# Patient Record
Sex: FEMALE | Race: WHITE | Employment: FULL TIME | URBAN - METROPOLITAN AREA
[De-identification: names, ages, dates, MRNs, and addresses within clinical notes are randomized per-mention and may not be internally consistent; named-entity substitution may affect disease eponyms.]

---

## 2017-08-14 ENCOUNTER — APPOINTMENT (OUTPATIENT)
Dept: GENERAL RADIOLOGY | Facility: HOSPITAL | Age: 32
End: 2017-08-14
Attending: PHYSICIAN ASSISTANT
Payer: COMMERCIAL

## 2017-08-14 ENCOUNTER — HOSPITAL ENCOUNTER (EMERGENCY)
Facility: HOSPITAL | Age: 32
Discharge: HOME | End: 2017-08-14
Attending: PHYSICIAN ASSISTANT
Payer: COMMERCIAL

## 2017-08-14 VITALS
SYSTOLIC BLOOD PRESSURE: 112 MMHG | RESPIRATION RATE: 20 BRPM | BODY MASS INDEX: 24.55 KG/M2 | DIASTOLIC BLOOD PRESSURE: 74 MMHG | TEMPERATURE: 99 F | WEIGHT: 130 LBS | HEART RATE: 72 BPM | HEIGHT: 61 IN | OXYGEN SATURATION: 99 %

## 2017-08-14 DIAGNOSIS — S80.811A ABRASION OF MULTIPLE SITES OF RIGHT LOWER EXTREMITY, INITIAL ENCOUNTER: ICD-10-CM

## 2017-08-14 DIAGNOSIS — S92.514A CLOSED NONDISPLACED FRACTURE OF PROXIMAL PHALANX OF LESSER TOE OF RIGHT FOOT, INITIAL ENCOUNTER: Primary | ICD-10-CM

## 2017-08-14 PROCEDURE — 99284 EMERGENCY DEPT VISIT MOD MDM: CPT

## 2017-08-14 PROCEDURE — 73610 X-RAY EXAM OF ANKLE: CPT | Performed by: PHYSICIAN ASSISTANT

## 2017-08-14 PROCEDURE — 73630 X-RAY EXAM OF FOOT: CPT | Performed by: PHYSICIAN ASSISTANT

## 2017-08-14 PROCEDURE — 73560 X-RAY EXAM OF KNEE 1 OR 2: CPT | Performed by: PHYSICIAN ASSISTANT

## 2017-08-14 PROCEDURE — 28510 TREATMENT OF TOE FRACTURE: CPT

## 2017-08-14 RX ORDER — HYDROCODONE BITARTRATE AND ACETAMINOPHEN 5; 325 MG/1; MG/1
1 TABLET ORAL ONCE
Status: COMPLETED | OUTPATIENT
Start: 2017-08-14 | End: 2017-08-14

## 2017-08-14 RX ORDER — HYDROCODONE BITARTRATE AND ACETAMINOPHEN 5; 325 MG/1; MG/1
1 TABLET ORAL EVERY 6 HOURS PRN
Qty: 6 TABLET | Refills: 0 | Status: SHIPPED | OUTPATIENT
Start: 2017-08-14 | End: 2017-08-21

## 2017-08-14 RX ORDER — IBUPROFEN 600 MG/1
TABLET ORAL
Qty: 20 TABLET | Refills: 0 | Status: SHIPPED | OUTPATIENT
Start: 2017-08-14 | End: 2019-07-24

## 2017-08-15 NOTE — ED PROVIDER NOTES
Patient Seen in: [facility] AND [facility] Emergency Department    History   Patient presents with:   Foot Pain    Stated Complaint: foot pains    HPI    HPI: Lizzie Up is a 28year old female who presents with chief complaint of right knee, ankle and foot Resp 20   Ht 154.9 cm (5' 1\")   Wt 59 kg   LMP 08/13/2017   SpO2 100%   BMI 24.56 kg/m²         Physical Exam      Constitutional: The patient is cooperative. Appears well-developed and well-nourished. Mild discomfort.   Psychological: Alert, No abnormali speech. Skin: Skin is normal to inspection and palpation, except as document. Warm and dry. No obvious rash.     Differential diagnosis to include fracture vs. Strain/sprain vs. contusion       ED Course   Labs Reviewed - No data to display  Patient fitt

## 2019-04-08 ENCOUNTER — APPOINTMENT (OUTPATIENT)
Dept: LAB | Facility: HOSPITAL | Age: 34
End: 2019-04-08
Attending: ADVANCED PRACTICE MIDWIFE
Payer: COMMERCIAL

## 2019-04-08 ENCOUNTER — OFFICE VISIT (OUTPATIENT)
Dept: OBGYN CLINIC | Facility: CLINIC | Age: 34
End: 2019-04-08
Payer: COMMERCIAL

## 2019-04-08 ENCOUNTER — TELEPHONE (OUTPATIENT)
Dept: OBGYN CLINIC | Facility: CLINIC | Age: 34
End: 2019-04-08

## 2019-04-08 VITALS
DIASTOLIC BLOOD PRESSURE: 74 MMHG | SYSTOLIC BLOOD PRESSURE: 122 MMHG | WEIGHT: 135 LBS | BODY MASS INDEX: 25.49 KG/M2 | HEIGHT: 61 IN

## 2019-04-08 DIAGNOSIS — N92.6 IRREGULAR MENSES: ICD-10-CM

## 2019-04-08 DIAGNOSIS — Z30.011 ENCOUNTER FOR INITIAL PRESCRIPTION OF CONTRACEPTIVE PILLS: ICD-10-CM

## 2019-04-08 DIAGNOSIS — Z30.011 ENCOUNTER FOR INITIAL PRESCRIPTION OF CONTRACEPTIVE PILLS: Primary | ICD-10-CM

## 2019-04-08 PROBLEM — E07.9 THYROID DYSFUNCTION: Status: ACTIVE | Noted: 2019-04-08

## 2019-04-08 PROCEDURE — 36415 COLL VENOUS BLD VENIPUNCTURE: CPT

## 2019-04-08 PROCEDURE — 99202 OFFICE O/P NEW SF 15 MIN: CPT | Performed by: ADVANCED PRACTICE MIDWIFE

## 2019-04-08 PROCEDURE — 84702 CHORIONIC GONADOTROPIN TEST: CPT

## 2019-04-08 RX ORDER — NORETHINDRONE ACETATE AND ETHINYL ESTRADIOL .03; 1.5 MG/1; MG/1
1 TABLET ORAL DAILY
Qty: 1 PACKAGE | Refills: 11 | Status: SHIPPED | OUTPATIENT
Start: 2019-04-08 | End: 2019-05-06

## 2019-04-08 NOTE — TELEPHONE ENCOUNTER
Spoke with pt and advised unofficially bhcg is negative and results still need to be reviewed by MES. Pt agreed and voiced understanding.

## 2019-04-08 NOTE — PROGRESS NOTES
HPI:    Patient ID: Dwayne Lal is a 35year old female who presents for contraction. Reports menses irregular q 28-40 days x 5-7 days regular flow. Pt history of thyroid dysfunction managed by Dr Ghada Caraballo. Sexually active with one partner.   Incons

## 2019-04-09 ENCOUNTER — TELEPHONE (OUTPATIENT)
Dept: OBGYN CLINIC | Facility: CLINIC | Age: 34
End: 2019-04-09

## 2019-04-12 ENCOUNTER — TELEPHONE (OUTPATIENT)
Dept: OBGYN CLINIC | Facility: CLINIC | Age: 34
End: 2019-04-12

## 2019-04-12 RX ORDER — ONDANSETRON 4 MG/1
4 TABLET, FILM COATED ORAL EVERY 8 HOURS PRN
Qty: 30 TABLET | Refills: 0 | Status: SHIPPED | OUTPATIENT
Start: 2019-04-12 | End: 2019-07-24

## 2019-04-12 NOTE — TELEPHONE ENCOUNTER
Pt. States that the New Rx for birth control is making the pt feel nauseous,so she wants to know if she can try a different med?

## 2019-04-13 NOTE — TELEPHONE ENCOUNTER
She just started the pill so her body might need a little time to get adjusted. It is okay to give her an rx for Zopfran to help with the nausea. If it continues after the first month we can try a different form of birth control.

## 2019-04-13 NOTE — TELEPHONE ENCOUNTER
Notified pt of MES instructions & rx. Advised pt to give it 3 mos & if she continues to have unwanted side effects we can reevaluate & change medication. Suggested to pt to try to take ocp at noc before bed.  Pt verbalized an understanding & agrees w/ plan

## 2019-06-17 ENCOUNTER — OFFICE VISIT (OUTPATIENT)
Dept: OBGYN CLINIC | Facility: CLINIC | Age: 34
End: 2019-06-17
Payer: COMMERCIAL

## 2019-06-17 VITALS
WEIGHT: 132.25 LBS | SYSTOLIC BLOOD PRESSURE: 116 MMHG | DIASTOLIC BLOOD PRESSURE: 81 MMHG | HEART RATE: 84 BPM | HEIGHT: 61 IN | BODY MASS INDEX: 24.97 KG/M2

## 2019-06-17 DIAGNOSIS — N89.8 VAGINAL DRYNESS: Primary | ICD-10-CM

## 2019-06-17 PROCEDURE — 99213 OFFICE O/P EST LOW 20 MIN: CPT | Performed by: ADVANCED PRACTICE MIDWIFE

## 2019-06-17 NOTE — PROGRESS NOTES
HPI:    Patient ID: Salma Caba is a 29year old female. Episodic visit to discuss vaginal dryness that is interfering with her personal relationship. Patient is concerned about her hormone levels, feels that her estrogen might be low.  She has an en well-nourished. HENT:   Head: Normocephalic and atraumatic. Eyes: No scleral icterus. Neck: Normal range of motion. Pulmonary/Chest: Effort normal. No respiratory distress. Genitourinary: No labial fusion.  There is no rash, tenderness, lesion or

## 2019-06-20 ENCOUNTER — TELEPHONE (OUTPATIENT)
Dept: OBGYN CLINIC | Facility: CLINIC | Age: 34
End: 2019-06-20

## 2019-06-20 NOTE — PROGRESS NOTES
Patient seen in conjunction with SAPN. I was present for history, physical, assessment and plan. I endorse the documentation per SAPN.

## 2019-06-28 PROCEDURE — 99284 EMERGENCY DEPT VISIT MOD MDM: CPT

## 2019-06-28 PROCEDURE — 93005 ELECTROCARDIOGRAM TRACING: CPT

## 2019-06-28 PROCEDURE — 36415 COLL VENOUS BLD VENIPUNCTURE: CPT

## 2019-06-28 PROCEDURE — 93010 ELECTROCARDIOGRAM REPORT: CPT | Performed by: EMERGENCY MEDICINE

## 2019-06-29 ENCOUNTER — HOSPITAL ENCOUNTER (EMERGENCY)
Facility: HOSPITAL | Age: 34
Discharge: HOME | End: 2019-06-29
Attending: EMERGENCY MEDICINE
Payer: COMMERCIAL

## 2019-06-29 VITALS
BODY MASS INDEX: 23.92 KG/M2 | RESPIRATION RATE: 20 BRPM | DIASTOLIC BLOOD PRESSURE: 82 MMHG | SYSTOLIC BLOOD PRESSURE: 105 MMHG | TEMPERATURE: 98 F | WEIGHT: 130 LBS | HEART RATE: 91 BPM | OXYGEN SATURATION: 95 % | HEIGHT: 62 IN

## 2019-06-29 DIAGNOSIS — R07.9 CHEST PAIN OF UNCERTAIN ETIOLOGY: Primary | ICD-10-CM

## 2019-06-29 LAB
ANION GAP SERPL CALC-SCNC: 4 MMOL/L (ref 0–18)
BASOPHILS # BLD AUTO: 0.03 X10(3) UL (ref 0–0.2)
BASOPHILS NFR BLD AUTO: 0.5 %
BUN BLD-MCNC: 13 MG/DL (ref 7–18)
BUN/CREAT SERPL: 12.9 (ref 10–20)
CALCIUM BLD-MCNC: 8.5 MG/DL (ref 8.5–10.1)
CHLORIDE SERPL-SCNC: 105 MMOL/L (ref 98–112)
CO2 SERPL-SCNC: 29 MMOL/L (ref 21–32)
CREAT BLD-MCNC: 1.01 MG/DL (ref 0.55–1.02)
DEPRECATED RDW RBC AUTO: 40.5 FL (ref 35.1–46.3)
EOSINOPHIL # BLD AUTO: 0.13 X10(3) UL (ref 0–0.7)
EOSINOPHIL NFR BLD AUTO: 2.2 %
ERYTHROCYTE [DISTWIDTH] IN BLOOD BY AUTOMATED COUNT: 11.2 % (ref 11–15)
GLUCOSE BLD-MCNC: 112 MG/DL (ref 70–99)
HCT VFR BLD AUTO: 38.6 % (ref 35–48)
HGB BLD-MCNC: 13.1 G/DL (ref 12–16)
IMM GRANULOCYTES # BLD AUTO: 0.01 X10(3) UL (ref 0–1)
IMM GRANULOCYTES NFR BLD: 0.2 %
LYMPHOCYTES # BLD AUTO: 1.6 X10(3) UL (ref 1–4)
LYMPHOCYTES NFR BLD AUTO: 27.6 %
MCH RBC QN AUTO: 33.4 PG (ref 26–34)
MCHC RBC AUTO-ENTMCNC: 33.9 G/DL (ref 31–37)
MCV RBC AUTO: 98.5 FL (ref 80–100)
MONOCYTES # BLD AUTO: 0.65 X10(3) UL (ref 0.1–1)
MONOCYTES NFR BLD AUTO: 11.2 %
NEUTROPHILS # BLD AUTO: 3.37 X10 (3) UL (ref 1.5–7.7)
NEUTROPHILS # BLD AUTO: 3.37 X10(3) UL (ref 1.5–7.7)
NEUTROPHILS NFR BLD AUTO: 58.3 %
OSMOLALITY SERPL CALC.SUM OF ELEC: 287 MOSM/KG (ref 275–295)
PLATELET # BLD AUTO: 339 10(3)UL (ref 150–450)
POTASSIUM SERPL-SCNC: 3.7 MMOL/L (ref 3.5–5.1)
RBC # BLD AUTO: 3.92 X10(6)UL (ref 3.8–5.3)
SODIUM SERPL-SCNC: 138 MMOL/L (ref 136–145)
TROPONIN I SERPL-MCNC: <0.045 NG/ML (ref ?–0.04)
WBC # BLD AUTO: 5.8 X10(3) UL (ref 4–11)

## 2019-06-29 PROCEDURE — 84484 ASSAY OF TROPONIN QUANT: CPT | Performed by: EMERGENCY MEDICINE

## 2019-06-29 PROCEDURE — 80048 BASIC METABOLIC PNL TOTAL CA: CPT | Performed by: EMERGENCY MEDICINE

## 2019-06-29 PROCEDURE — 85025 COMPLETE CBC W/AUTO DIFF WBC: CPT | Performed by: EMERGENCY MEDICINE

## 2019-06-29 RX ORDER — LIDOCAINE HYDROCHLORIDE 20 MG/ML
10 SOLUTION OROPHARYNGEAL ONCE
Status: COMPLETED | OUTPATIENT
Start: 2019-06-29 | End: 2019-06-29

## 2019-06-29 RX ORDER — MAGNESIUM HYDROXIDE/ALUMINUM HYDROXICE/SIMETHICONE 120; 1200; 1200 MG/30ML; MG/30ML; MG/30ML
30 SUSPENSION ORAL ONCE
Status: COMPLETED | OUTPATIENT
Start: 2019-06-29 | End: 2019-06-29

## 2019-06-29 NOTE — ED NOTES
Patient provided discharge instructions. Patient provided with instructions of how and when to follow up with healthcare providers. Patient provided instructions of when to seek emergency care. Patient verbalizes understanding. All questions answered.  Driss

## 2019-06-29 NOTE — ED PROVIDER NOTES
Patient Seen in: [facility] AND [facility] Emergency Department    History   Patient presents with:  Chest Pain      HPI    Patient presents to the ED complaining of epigastric and midsternal burning discomfort starting at 7 PM.  Symptoms radiate up into her loki stridor. No respiratory distress. Abdominal: Soft. She exhibits no distension. There is tenderness. There is no rebound and no guarding. Mild epigastric tenderness   Musculoskeletal: She exhibits no edema or deformity.    Neurological: She is alert and interpreted all ED vitals.     Pulse Ox: 95%, Room air, Normal     Monitor Interpretation:   normal sinus rhythm    Differential Diagnosis/ Diagnostic Considerations: GERD, gastritis, atypical chest pain, ACS    Medical Record Review: I personally reviewed

## 2019-07-01 ENCOUNTER — TELEPHONE (OUTPATIENT)
Dept: OBGYN CLINIC | Facility: CLINIC | Age: 34
End: 2019-07-01

## 2019-07-01 NOTE — TELEPHONE ENCOUNTER
Per pt has been under the care of NOEMY in past and would like to start care with him again. Is interested in planning for pregnancy. Pt stated she is unable to schedule at this time, but will call back to schedule. Dept number given. No further question.

## 2019-07-12 ENCOUNTER — MED REC SCAN ONLY (OUTPATIENT)
Dept: OBGYN CLINIC | Facility: CLINIC | Age: 34
End: 2019-07-12

## 2019-07-24 ENCOUNTER — LAB ENCOUNTER (OUTPATIENT)
Dept: LAB | Facility: HOSPITAL | Age: 34
End: 2019-07-24
Attending: ADVANCED PRACTICE MIDWIFE
Payer: COMMERCIAL

## 2019-07-24 ENCOUNTER — OFFICE VISIT (OUTPATIENT)
Dept: OBGYN CLINIC | Facility: CLINIC | Age: 34
End: 2019-07-24
Payer: COMMERCIAL

## 2019-07-24 VITALS
BODY MASS INDEX: 23 KG/M2 | DIASTOLIC BLOOD PRESSURE: 67 MMHG | WEIGHT: 128.19 LBS | HEART RATE: 94 BPM | SYSTOLIC BLOOD PRESSURE: 100 MMHG

## 2019-07-24 DIAGNOSIS — Z20.828 EXPOSURE TO HERPES: Primary | ICD-10-CM

## 2019-07-24 LAB
HBV SURFACE AG SER-ACNC: <0.1 [IU]/L
HBV SURFACE AG SERPL QL IA: NONREACTIVE
HCV AB SERPL QL IA: NONREACTIVE

## 2019-07-24 PROCEDURE — 36415 COLL VENOUS BLD VENIPUNCTURE: CPT

## 2019-07-24 PROCEDURE — 86694 HERPES SIMPLEX NES ANTBDY: CPT

## 2019-07-24 PROCEDURE — 86780 TREPONEMA PALLIDUM: CPT | Performed by: ADVANCED PRACTICE MIDWIFE

## 2019-07-24 PROCEDURE — 86803 HEPATITIS C AB TEST: CPT

## 2019-07-24 PROCEDURE — 87340 HEPATITIS B SURFACE AG IA: CPT

## 2019-07-24 PROCEDURE — 99213 OFFICE O/P EST LOW 20 MIN: CPT | Performed by: ADVANCED PRACTICE MIDWIFE

## 2019-07-24 PROCEDURE — 87389 HIV-1 AG W/HIV-1&-2 AB AG IA: CPT

## 2019-07-26 LAB
HSV TYPE 1/2 COMBINED AB, IGG: >22.4 IV
T PALLIDUM AB SER QL: NEGATIVE

## 2019-07-27 ENCOUNTER — TELEPHONE (OUTPATIENT)
Dept: OBGYN CLINIC | Facility: CLINIC | Age: 34
End: 2019-07-27

## 2019-07-27 DIAGNOSIS — B00.9 HSV INFECTION: Primary | ICD-10-CM

## 2019-07-27 NOTE — TELEPHONE ENCOUNTER
----- Message from Ritu Qiu CNM sent at 7/27/2019 12:53 PM CDT -----  I need titers for each HSV type.  Please call lab

## 2019-07-27 NOTE — TELEPHONE ENCOUNTER
Per Freddy Beasley in lab we need to call on Monday since specimen was sent to their reference lab which is closed on the weekend.

## 2019-07-29 NOTE — TELEPHONE ENCOUNTER
Spoke with Patricia Kramer in the lab and requested titers for each HSV type. Per Patricia Kramer they are not able to do the send out for this as they only keep the blood for 5 days. Pt would need to be redrawn and we are to order [id number].

## 2019-07-31 ENCOUNTER — LAB ENCOUNTER (OUTPATIENT)
Dept: LAB | Facility: HOSPITAL | Age: 34
End: 2019-07-31
Attending: ADVANCED PRACTICE MIDWIFE
Payer: COMMERCIAL

## 2019-07-31 DIAGNOSIS — B00.9 HSV INFECTION: ICD-10-CM

## 2019-07-31 DIAGNOSIS — Z20.828 EXPOSURE TO HERPES: Primary | ICD-10-CM

## 2019-07-31 PROCEDURE — 86695 HERPES SIMPLEX TYPE 1 TEST: CPT

## 2019-07-31 PROCEDURE — 86696 HERPES SIMPLEX TYPE 2 TEST: CPT

## 2019-07-31 PROCEDURE — 86694 HERPES SIMPLEX NES ANTBDY: CPT

## 2019-07-31 PROCEDURE — 36415 COLL VENOUS BLD VENIPUNCTURE: CPT

## 2019-07-31 NOTE — TELEPHONE ENCOUNTER
Home # was answered by a man who stated no one by that name lived there. Work # rang then went busy. Regular and Certified letters mailed to pt.

## 2019-07-31 NOTE — TELEPHONE ENCOUNTER
Pt states she saw the [facility] msg.and returned the call. Pt was advised she will need to return to the lab for an additional blood draw for the titers for each HSV type. The order is in the computer. Pt states she will go to the lab tonight.   Pt was adv

## 2019-08-01 ENCOUNTER — TELEPHONE (OUTPATIENT)
Dept: OBGYN CLINIC | Facility: CLINIC | Age: 34
End: 2019-08-01

## 2019-08-01 NOTE — TELEPHONE ENCOUNTER
Pt wants to know if theres any medication she can take orally instead of the monistat.  Please advise

## 2019-08-01 NOTE — TELEPHONE ENCOUNTER
Pt was told by cnm at last visit to use replense for vag dryness. Pt now feels she has a yeast infection & wants an rx to traet. Advised pt she needs appt to get rx but she can use 3 or 5 day otc monistat.  Pt verbalized an understanding & agrees w/ plan

## 2019-08-03 LAB
HSV 1 GLYCOPROTEIN G AB, IGG: 0.4 IV
HSV 2 GLYCOPROTEIN G AB, IGG: 23.4 IV
HSV TYPE 1/2 COMBINED AB, IGG: >22.4 IV
HSV TYPE 1/2 COMBINED ABS, IGM: 0.41 IV

## 2019-08-05 ENCOUNTER — TELEPHONE (OUTPATIENT)
Dept: OBGYN CLINIC | Facility: CLINIC | Age: 34
End: 2019-08-05

## 2019-08-05 RX ORDER — VALACYCLOVIR HYDROCHLORIDE 1 G/1
1 TABLET, FILM COATED ORAL EVERY 12 HOURS SCHEDULED
Qty: 14 TABLET | Refills: 0 | Status: SHIPPED | OUTPATIENT
Start: 2019-08-05 | End: 2019-08-12

## 2019-08-05 NOTE — TELEPHONE ENCOUNTER
Christa Castellon CNM  P Em Ob/Gyne Midwifery Clinical Pool             Discussed with Patient. Please print and mail results - unable to release on [facility]. Results mailed to pt per MBW.

## 2019-08-06 ENCOUNTER — PATIENT MESSAGE (OUTPATIENT)
Dept: OBGYN CLINIC | Facility: CLINIC | Age: 34
End: 2019-08-06

## 2019-08-07 PROBLEM — Z20.828 EXPOSURE TO HERPES SIMPLEX VIRUS (HSV): Status: ACTIVE | Noted: 2019-08-07

## 2019-08-08 NOTE — PROGRESS NOTES
HPI:   Mayo Filter is a 29year old female who presents for a gyne consult. Fiance just diagnosed with herpes outbreak. Patient has never had any suspicious lesions.    Wt Readings from Last 3 Encounters:  07/24/19 : 128 lb 3.2 oz (58.2 kg)  06/29/19 : intact    ASSESSMENT AND PLAN:   Herminia Brand is a 29year old female who presents for a gyne exam.     1. Exposure to herpes  Counseling 100% of visit. 15 minute visit.   - HEPATITIS B SURFACE ANTIGEN; Future  - HCV ANTIBODY;  Future  - T PALLIDUM SCREE

## 2019-10-03 ENCOUNTER — OFFICE VISIT (OUTPATIENT)
Dept: OBGYN CLINIC | Facility: CLINIC | Age: 34
End: 2019-10-03
Payer: COMMERCIAL

## 2019-10-03 VITALS
BODY MASS INDEX: 23 KG/M2 | WEIGHT: 128 LBS | HEART RATE: 84 BPM | DIASTOLIC BLOOD PRESSURE: 74 MMHG | SYSTOLIC BLOOD PRESSURE: 106 MMHG

## 2019-10-03 DIAGNOSIS — Z01.419 WOMEN'S ANNUAL ROUTINE GYNECOLOGICAL EXAMINATION: Primary | ICD-10-CM

## 2019-10-03 DIAGNOSIS — Z31.69 ENCOUNTER FOR PRECONCEPTION CONSULTATION: ICD-10-CM

## 2019-10-03 DIAGNOSIS — R89.4 HERPES SIMPLEX ANTIBODY POSITIVE: ICD-10-CM

## 2019-10-03 PROCEDURE — 99213 OFFICE O/P EST LOW 20 MIN: CPT | Performed by: OBSTETRICS & GYNECOLOGY

## 2019-10-03 PROCEDURE — 99395 PREV VISIT EST AGE 18-39: CPT | Performed by: OBSTETRICS & GYNECOLOGY

## 2019-10-04 NOTE — PROGRESS NOTES
Annual Gyn Exam    HPI  Teresa London is a known patient of mine from my previous [address] [facility] practice who I have not seen in 5+ years. She is here for an annual gynecologic evaluation. In addition, she has concerns about a recent diagnosis of HSV 2.   She and her infection. Current Outpatient Medications:   •  LEVOTHYROXINE SODIUM OR, 100 mcg by Does not apply route.  , Disp: , Rfl:     Review of Systems   Constitutional: Negative for chills, fatigue and fever. HENT: Negative for hearing loss.     Eyes: Negativ is normal.     /74   Pulse 84   Wt 128 lb (58.1 kg)   LMP 09/28/2019   BMI 23.41 kg/m²     Assessment and Plan  1.  Women's annual routine gynecological examination  A: 29 y.o. Chemo Caba here for annual gynecologic evaluation  Exam, pap with HPV and fernando

## 2019-12-10 ENCOUNTER — OFFICE VISIT (OUTPATIENT)
Dept: OBGYN CLINIC | Facility: CLINIC | Age: 34
End: 2019-12-10
Payer: COMMERCIAL

## 2019-12-10 VITALS
BODY MASS INDEX: 24 KG/M2 | HEART RATE: 97 BPM | DIASTOLIC BLOOD PRESSURE: 69 MMHG | WEIGHT: 130 LBS | SYSTOLIC BLOOD PRESSURE: 103 MMHG

## 2019-12-10 DIAGNOSIS — Z32.01 PREGNANCY EXAMINATION OR TEST, POSITIVE RESULT: Primary | ICD-10-CM

## 2019-12-10 DIAGNOSIS — N92.6 MISSED MENSES: ICD-10-CM

## 2019-12-10 PROCEDURE — 81025 URINE PREGNANCY TEST: CPT | Performed by: OBSTETRICS & GYNECOLOGY

## 2019-12-10 PROCEDURE — 99213 OFFICE O/P EST LOW 20 MIN: CPT | Performed by: OBSTETRICS & GYNECOLOGY

## 2019-12-10 RX ORDER — LEVOTHYROXINE SODIUM 0.1 MG/1
125 TABLET ORAL
COMMUNITY
Start: 2019-12-09 | End: 2022-01-18

## 2019-12-11 NOTE — PROGRESS NOTES
[facility], [facility]  Obstetrics and Gynecology  Pregnancy Confirmation  Carmine Good MD    HPI     Dwayne Lal is a 29year old [id number] with Patient's last menstrual period was 09/28/2019 (exact date). who presents for pregnancy confirmation.      Jocelin file    Social Needs      Financial resource strain: Not on file      Food insecurity:        Worry: Not on file        Inability: Not on file      Transportation needs:        Medical: Not on file        Non-medical: Not on file    Tobacco Use      Javierin visit. Discussed that we will review genetic testing at her [address] visit. Discussed flu vaccine recommendations which patient has already had. Pt to f/u in 3 days for US to confirm viability and dating  Pt to follow up for RN visit in 1-2 weeks.   This is a

## 2019-12-13 ENCOUNTER — TELEPHONE (OUTPATIENT)
Dept: OBGYN CLINIC | Facility: CLINIC | Age: 34
End: 2019-12-13

## 2019-12-13 ENCOUNTER — OFFICE VISIT (OUTPATIENT)
Dept: OBGYN CLINIC | Facility: CLINIC | Age: 34
End: 2019-12-13
Payer: COMMERCIAL

## 2019-12-13 VITALS — SYSTOLIC BLOOD PRESSURE: 108 MMHG | BODY MASS INDEX: 24 KG/M2 | DIASTOLIC BLOOD PRESSURE: 72 MMHG | WEIGHT: 128.81 LBS

## 2019-12-13 DIAGNOSIS — Z36.87 UNSURE OF LMP (LAST MENSTRUAL PERIOD) AS REASON FOR ULTRASOUND SCAN: Primary | ICD-10-CM

## 2019-12-13 DIAGNOSIS — Z32.01 PREGNANCY EXAMINATION OR TEST, POSITIVE RESULT: ICD-10-CM

## 2019-12-13 PROCEDURE — 99214 OFFICE O/P EST MOD 30 MIN: CPT | Performed by: OBSTETRICS & GYNECOLOGY

## 2019-12-13 PROCEDURE — 76817 TRANSVAGINAL US OBSTETRIC: CPT | Performed by: OBSTETRICS & GYNECOLOGY

## 2019-12-13 NOTE — PROGRESS NOTES
[facility], [facility]  Obstetrics and Gynecology  Ultrasound Visit  Claire Bray MD    HPI     Evangelista Pelayo is a 29year old [id number] with Patient's last menstrual period was 09/23/2019. who presents for ultrasound to confirm viability and dating.      Driss education level: Not on file    Occupational History      Not on file    Social Needs      Financial resource strain: Not on file      Food insecurity:        Worry: Not on file        Inability: Not on file      Transportation needs:        Medical: Not o (Z36.87) Unsure of LMP (last menstrual period) as reason for ultrasound scan  (primary encounter diagnosis)    (Z32.01) Pregnancy examination or test, positive result      Plan   Discussed signs/symptoms of early pregnancy.   Discussed diet, exercise, a

## 2019-12-13 NOTE — TELEPHONE ENCOUNTER
I contacted patient on the phone and reviewed her questions and answered all of them to her satisfaction.

## 2019-12-20 ENCOUNTER — HOSPITAL ENCOUNTER (EMERGENCY)
Facility: HOSPITAL | Age: 34
Discharge: HOME | End: 2019-12-20
Payer: COMMERCIAL

## 2019-12-20 ENCOUNTER — APPOINTMENT (OUTPATIENT)
Dept: ULTRASOUND IMAGING | Facility: HOSPITAL | Age: 34
End: 2019-12-20
Attending: NURSE PRACTITIONER
Payer: COMMERCIAL

## 2019-12-20 VITALS
RESPIRATION RATE: 18 BRPM | DIASTOLIC BLOOD PRESSURE: 70 MMHG | WEIGHT: 120 LBS | HEART RATE: 91 BPM | SYSTOLIC BLOOD PRESSURE: 92 MMHG | OXYGEN SATURATION: 98 % | BODY MASS INDEX: 22.66 KG/M2 | TEMPERATURE: 99 F | HEIGHT: 61 IN

## 2019-12-20 DIAGNOSIS — O03.9 SPONTANEOUS ABORTION: Primary | ICD-10-CM

## 2019-12-20 PROCEDURE — 86901 BLOOD TYPING SEROLOGIC RH(D): CPT | Performed by: NURSE PRACTITIONER

## 2019-12-20 PROCEDURE — 85025 COMPLETE CBC W/AUTO DIFF WBC: CPT | Performed by: NURSE PRACTITIONER

## 2019-12-20 PROCEDURE — 76817 TRANSVAGINAL US OBSTETRIC: CPT | Performed by: NURSE PRACTITIONER

## 2019-12-20 PROCEDURE — 81025 URINE PREGNANCY TEST: CPT

## 2019-12-20 PROCEDURE — 96374 THER/PROPH/DIAG INJ IV PUSH: CPT

## 2019-12-20 PROCEDURE — 80076 HEPATIC FUNCTION PANEL: CPT | Performed by: NURSE PRACTITIONER

## 2019-12-20 PROCEDURE — 76801 OB US < 14 WKS SINGLE FETUS: CPT | Performed by: NURSE PRACTITIONER

## 2019-12-20 PROCEDURE — 99284 EMERGENCY DEPT VISIT MOD MDM: CPT

## 2019-12-20 PROCEDURE — 84702 CHORIONIC GONADOTROPIN TEST: CPT | Performed by: NURSE PRACTITIONER

## 2019-12-20 PROCEDURE — 81001 URINALYSIS AUTO W/SCOPE: CPT | Performed by: NURSE PRACTITIONER

## 2019-12-20 PROCEDURE — 86900 BLOOD TYPING SEROLOGIC ABO: CPT | Performed by: NURSE PRACTITIONER

## 2019-12-20 PROCEDURE — 80048 BASIC METABOLIC PNL TOTAL CA: CPT | Performed by: NURSE PRACTITIONER

## 2019-12-20 RX ORDER — ACETAMINOPHEN AND CODEINE PHOSPHATE 300; 30 MG/1; MG/1
1-2 TABLET ORAL EVERY 6 HOURS PRN
Qty: 10 TABLET | Refills: 0 | Status: SHIPPED | OUTPATIENT
Start: 2019-12-20 | End: 2019-12-27

## 2019-12-20 RX ORDER — ONDANSETRON 2 MG/ML
4 INJECTION INTRAMUSCULAR; INTRAVENOUS ONCE
Status: COMPLETED | OUTPATIENT
Start: 2019-12-20 | End: 2019-12-20

## 2019-12-20 RX ORDER — ACETAMINOPHEN 500 MG
1000 TABLET ORAL ONCE
Status: COMPLETED | OUTPATIENT
Start: 2019-12-20 | End: 2019-12-20

## 2019-12-21 NOTE — ED PROVIDER NOTES
Patient Seen in: [facility] AND [facility] Emergency Department    History   Patient presents with:  Pregnancy Issues    Stated Complaint: vaginal bleeding     HPI    Patient is G 4, P 0, 8 weeks pregnant complains of vaginal bleeding that began today  Patient s 145/79   Pulse 115   Resp 18   Temp 98.6 °F (37 °C)   Temp src    SpO2 99 %   O2 Device        Current:/79   Pulse 115   Temp 98.6 °F (37 °C)   Resp 18   Ht 154.9 cm (5' 1\")   Wt 54.4 kg   LMP 09/28/2019 (Exact Date)   SpO2 99%   BMI 22.67 kg/m² components:    POCT Urine Pregnancy Positive (*)     All other components within normal limits   CBC W/ DIFFERENTIAL - Abnormal; Notable for the following components:    WBC 11.8 (*)     Neutrophil Absolute Prelim 9.94 (*)     Neutrophil Absolute 9.94 (*) [id number]  [phone]            Medications Prescribed:  Current Discharge Medication List    START taking these medications    Acetaminophen-Codeine #3 300-30 MG Oral Tab  Take 1-2 tablets by mouth every 6 (six) hours as needed for Pain.   Qty: 10 tabl

## 2019-12-31 ENCOUNTER — TELEPHONE (OUTPATIENT)
Dept: OBGYN UNIT | Facility: HOSPITAL | Age: 34
End: 2019-12-31

## 2020-06-24 ENCOUNTER — TELEPHONE (OUTPATIENT)
Dept: OBGYN CLINIC | Facility: CLINIC | Age: 35
End: 2020-06-24

## 2020-06-24 NOTE — TELEPHONE ENCOUNTER
Pt voices she will call [facility] and have her TSH level sent to the office before her appointment with Dr. Willa Chatman on 6/30.

## 2020-06-24 NOTE — TELEPHONE ENCOUNTER
Patient calling to advise she received blood work with a positive pregnancy test from her primary care doctor. Patient scheduled herself through [facility] on 6/30/2020, patients suspects she is around 2 months. Please call at:[phone],thanks.

## 2020-06-30 ENCOUNTER — OFFICE VISIT (OUTPATIENT)
Dept: OBGYN CLINIC | Facility: CLINIC | Age: 35
End: 2020-06-30
Payer: COMMERCIAL

## 2020-06-30 VITALS
HEIGHT: 60 IN | WEIGHT: 131 LBS | BODY MASS INDEX: 25.72 KG/M2 | SYSTOLIC BLOOD PRESSURE: 118 MMHG | DIASTOLIC BLOOD PRESSURE: 72 MMHG

## 2020-06-30 DIAGNOSIS — Z32.01 PREGNANCY EXAMINATION OR TEST, POSITIVE RESULT: Primary | ICD-10-CM

## 2020-06-30 DIAGNOSIS — N92.6 MISSED MENSES: ICD-10-CM

## 2020-06-30 PROCEDURE — 81025 URINE PREGNANCY TEST: CPT | Performed by: OBSTETRICS & GYNECOLOGY

## 2020-06-30 PROCEDURE — 99213 OFFICE O/P EST LOW 20 MIN: CPT | Performed by: OBSTETRICS & GYNECOLOGY

## 2020-06-30 RX ORDER — LEVOTHYROXINE SODIUM 0.12 MG/1
TABLET ORAL
COMMUNITY
End: 2020-10-27

## 2020-06-30 NOTE — PROGRESS NOTES
Patient just had  June 23, 2020 HCG qt at [address] [place] [address] showing    Hcg, qt  Volume [id number]           tsh 9.36          T4, free 1.1

## 2020-06-30 NOTE — PROGRESS NOTES
[facility], [facility]  Obstetrics and Gynecology  Pregnancy Confirmation  Shon Escudero MD    HPI     Mann Rodriguez is a 28year old [id number] with Patient's last menstrual period was 03/27/2019 (exact date). who presents for pregnancy confirmation.      Jocelin Thyroid disease Mother    • Breast Cancer Other 52        One of 5 sisters-none others with breast CA   • Ovarian Cancer Neg    • Colon Cancer Neg      Social History   Social History    Socioeconomic History      Marital status: Single      Spouse name: N edema  Ultrasound   Bedside US shows +FHTs  Assessment   Hermann Worley is a 28year old female  with positive pregnancy test in first trimester with irregular menses      (Z32.01) Pregnancy examination or test, positive result  (primary encounter diagnosi

## 2020-07-04 ENCOUNTER — HOSPITAL ENCOUNTER (EMERGENCY)
Facility: HOSPITAL | Age: 35
Discharge: HOME | End: 2020-07-04
Attending: EMERGENCY MEDICINE
Payer: COMMERCIAL

## 2020-07-04 VITALS
WEIGHT: 129 LBS | RESPIRATION RATE: 16 BRPM | HEART RATE: 114 BPM | HEIGHT: 61 IN | BODY MASS INDEX: 24.35 KG/M2 | SYSTOLIC BLOOD PRESSURE: 109 MMHG | OXYGEN SATURATION: 99 % | DIASTOLIC BLOOD PRESSURE: 74 MMHG | TEMPERATURE: 99 F

## 2020-07-04 DIAGNOSIS — K59.00 CONSTIPATION, UNSPECIFIED CONSTIPATION TYPE: Primary | ICD-10-CM

## 2020-07-04 DIAGNOSIS — R82.71 BACTERIURIA: ICD-10-CM

## 2020-07-04 LAB
BILIRUB UR QL: NEGATIVE
COLOR UR: YELLOW
GLUCOSE UR-MCNC: NEGATIVE MG/DL
HGB UR QL STRIP.AUTO: NEGATIVE
LEUKOCYTE ESTERASE UR QL STRIP.AUTO: NEGATIVE
NITRITE UR QL STRIP.AUTO: NEGATIVE
PH UR: 5 [PH] (ref 5–8)
PROT UR-MCNC: 30 MG/DL
RBC #/AREA URNS AUTO: 1 /HPF
SP GR UR STRIP: 1.03 (ref 1–1.03)
UROBILINOGEN UR STRIP-ACNC: 4
WBC #/AREA URNS AUTO: 5 /HPF

## 2020-07-04 PROCEDURE — 81001 URINALYSIS AUTO W/SCOPE: CPT | Performed by: EMERGENCY MEDICINE

## 2020-07-04 PROCEDURE — 99283 EMERGENCY DEPT VISIT LOW MDM: CPT

## 2020-07-04 RX ORDER — CEPHALEXIN 250 MG/1
250 CAPSULE ORAL 4 TIMES DAILY
Qty: 20 CAPSULE | Refills: 0 | Status: SHIPPED | OUTPATIENT
Start: 2020-07-04 | End: 2020-07-09

## 2020-07-04 NOTE — ED INITIAL ASSESSMENT (HPI)
Pt came in for constipation for 3 weeks. Reports bloating and nausea. Pt is about 12 weeks pregnant. RR even and nonlabored, speaking in full sentences, ambulatory with steady gait.

## 2020-07-04 NOTE — ED PROVIDER NOTES
Patient Seen in: [facility] AND [facility] Emergency Department      History   Patient presents with:  Constipation    Stated Complaint:     HPI    12-year-old G4, P0 at approximately 12 weeks gestation presents for evaluation of constipation.   Patient reports c Pulmonary effort is normal. No respiratory distress. Breath sounds: Normal breath sounds. Abdominal:      General: Bowel sounds are normal. There is no distension. Palpations: Abdomen is soft. Tenderness: There is no tenderness.  There is n

## 2020-07-06 ENCOUNTER — TELEPHONE (OUTPATIENT)
Dept: OBGYN CLINIC | Facility: CLINIC | Age: 35
End: 2020-07-06

## 2020-07-06 NOTE — TELEPHONE ENCOUNTER
Pt requesting to speak with nurse regarding FMLA ppwrk, states it was faxed to office some time last week

## 2020-07-06 NOTE — TELEPHONE ENCOUNTER
Per pt her employer requested faxed [facility] paperwork three times to . Per Michel Matute this is their correct fax. Will contact pt directly.

## 2020-07-06 NOTE — TELEPHONE ENCOUNTER
Nurse called asking if we rec'd FMLA paper work, We have not and stated I will call the patient. Patient will email forms to us,  I explained our process and sent [facility] message for HIPAA and fee. 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

## 2020-07-06 NOTE — TELEPHONE ENCOUNTER
Pt called and she voices she is feeling better and had a BM this am. Pt is currently taking Miralax with good results. Reviewed with pt to push fluids, keep active as possible and that she can try prune juice and high fiber foods. Pt voices understanding.

## 2020-07-06 NOTE — TELEPHONE ENCOUNTER
----- Message from Paloma Stephens RN sent at 7/3/2020  8:25 AM CDT -----  Regarding: FW: Other  Contact: [phone]  Sent to [facility] pool  ----- Message -----  From: Edwin Hui  Sent: 7/2/2020   6:09 PM CDT  To: Em Ob/Gyne Midwifery Clinical

## 2020-07-09 NOTE — TELEPHONE ENCOUNTER
Dr. Rock Must,    [id number] [address] - intermittent leave only for prenatal appts     Please sign off on form:  -Highlight the patient and hit \"Chart\" button.   -In Chart Review, w/in the Encounter tab - click 1 time on the Telephone call encounter for 7/6/20 Scroll down

## 2020-07-13 ENCOUNTER — OFFICE VISIT (OUTPATIENT)
Dept: OBGYN CLINIC | Facility: CLINIC | Age: 35
End: 2020-07-13
Payer: COMMERCIAL

## 2020-07-13 VITALS
HEART RATE: 109 BPM | SYSTOLIC BLOOD PRESSURE: 114 MMHG | BODY MASS INDEX: 25 KG/M2 | WEIGHT: 131 LBS | DIASTOLIC BLOOD PRESSURE: 79 MMHG

## 2020-07-13 DIAGNOSIS — Z32.01 PREGNANCY EXAMINATION OR TEST, POSITIVE RESULT: ICD-10-CM

## 2020-07-13 DIAGNOSIS — Z36.87 UNSURE OF LMP (LAST MENSTRUAL PERIOD) AS REASON FOR ULTRASOUND SCAN: Primary | ICD-10-CM

## 2020-07-13 PROCEDURE — 99213 OFFICE O/P EST LOW 20 MIN: CPT | Performed by: OBSTETRICS & GYNECOLOGY

## 2020-07-13 PROCEDURE — 81025 URINE PREGNANCY TEST: CPT | Performed by: OBSTETRICS & GYNECOLOGY

## 2020-07-13 PROCEDURE — 76817 TRANSVAGINAL US OBSTETRIC: CPT | Performed by: OBSTETRICS & GYNECOLOGY

## 2020-07-13 NOTE — PROGRESS NOTES
[facility], [facility]  Obstetrics and Gynecology  Ultrasound Visit  Carmine Good MD    HPI     Edison Calderon is a 28year old [id number] with Patient's last menstrual period was 03/27/2020. who presents for ultrasound to confirm viability and dating.      Riya Cancer Neg    • Colon Cancer Neg      Social History   Social History    Socioeconomic History      Marital status: Single      Spouse name: Not on file      Number of children: Not on file      Years of education: Not on file      Highest education level: Ray Flores is a 28year old female  with viable IUP at 11 0/7 wks by today's US which is not c/w LMP dating.   AMA pregnancy      (Z36.87) Unsure of LMP (last menstrual period) as reason for ultrasound scan  (primary encounter diagno n/a

## 2020-07-20 ENCOUNTER — NURSE ONLY (OUTPATIENT)
Dept: OBGYN CLINIC | Facility: CLINIC | Age: 35
End: 2020-07-20
Payer: COMMERCIAL

## 2020-07-20 DIAGNOSIS — L81.8 DECORATIVE TATTOO: ICD-10-CM

## 2020-07-20 DIAGNOSIS — O09.521 MULTIGRAVIDA OF ADVANCED MATERNAL AGE IN FIRST TRIMESTER: Primary | ICD-10-CM

## 2020-07-20 NOTE — PROGRESS NOTES
Pt Name and  verified. OB History     T0    L0    SAB2  TAB1  Ectopic0  Multiple0  Live Births0         Pt is here today for RN [employer] Education.     Missed menses apt with: INDIRA  LMP: 2020     Pre  BMI:  25.52  EPDS score: 30  +

## 2020-07-30 ENCOUNTER — HOSPITAL ENCOUNTER (OUTPATIENT)
Dept: PERINATAL CARE | Facility: HOSPITAL | Age: 35
Discharge: HOME | End: 2020-07-30
Attending: OBSTETRICS & GYNECOLOGY
Payer: COMMERCIAL

## 2020-07-30 VITALS
WEIGHT: 131 LBS | BODY MASS INDEX: 25.72 KG/M2 | HEIGHT: 60 IN | SYSTOLIC BLOOD PRESSURE: 110 MMHG | HEART RATE: 112 BPM | DIASTOLIC BLOOD PRESSURE: 77 MMHG

## 2020-07-30 DIAGNOSIS — Z36.9 FIRST TRIMESTER SCREENING: Primary | ICD-10-CM

## 2020-07-30 DIAGNOSIS — O09.521 AMA (ADVANCED MATERNAL AGE) MULTIGRAVIDA 35+, FIRST TRIMESTER: ICD-10-CM

## 2020-07-30 DIAGNOSIS — Z36.9 FIRST TRIMESTER SCREENING: ICD-10-CM

## 2020-07-30 PROBLEM — O09.529 AMA (ADVANCED MATERNAL AGE) MULTIGRAVIDA 35+: Status: ACTIVE | Noted: 2020-07-30

## 2020-07-30 PROBLEM — O09.529 AMA (ADVANCED MATERNAL AGE) MULTIGRAVIDA 35+ (HCC): Status: ACTIVE | Noted: 2020-07-30

## 2020-07-30 PROCEDURE — 76813 OB US NUCHAL MEAS 1 GEST: CPT | Performed by: OBSTETRICS & GYNECOLOGY

## 2020-07-30 PROCEDURE — 99203 OFFICE O/P NEW LOW 30 MIN: CPT | Performed by: OBSTETRICS & GYNECOLOGY

## 2020-07-30 RX ORDER — GLYCERIN/MINERAL OIL
LOTION (ML) TOPICAL
COMMUNITY
End: 2021-03-29

## 2020-07-30 NOTE — PROGRESS NOTES
Reason for Consult:   Dear Dr. Pranav Bautista ,    Thank you for requesting ultrasound evaluation and maternal fetal medicine consultation on [place]. As you are aware she is a 28year old female with a Street pregnancy at 13w3d.   A maternal-fetal Laterality Date   • OTHER  2017    Gluteal Augmentation 2017 and Revision in 2018      Family History   Problem Relation Age of Onset   • Thyroid disease Father    • Thyroid disease Mother    • Breast Cancer Other 52        One of 5 sisters-none others wit of age or older can expect to experience two to three fold higher rates of hospitalization,  delivery, and pregnancy-related complications when compared to their younger counterparts.   The two most common medical problems complicating these  pregna in offspring of older women. These abnormalities are structural and unrelated to aneuploidy, thus they would not be detected by karyotype analysis.   For these reasons a complete, detailed ultrasound (level II) is advised even if the fetus has a normal [employer] nuchal translucency appears normal but unable to get an accurate measurement. Fetus: arms and legs seen suboptimally. Fetal head/skull and abdomen appeared normal. Stomach and bladder seen.    Uterus and adnexa appeared normal      [place] test drawn today

## 2020-08-07 ENCOUNTER — TELEPHONE (OUTPATIENT)
Dept: PERINATAL CARE | Facility: HOSPITAL | Age: 35
End: 2020-08-07

## 2020-08-07 NOTE — TELEPHONE ENCOUNTER
HARMONY screening results obt  Reviewed by MD Damaris Linda  Low risk for  Trisomy 21  1:[id number] risk  Low risk for Trisomy 18/13  1:35754 risk    Fetal sex: held for     Pt states understanding  Copy of results sent for scanning into pt record

## 2020-08-20 ENCOUNTER — LAB ENCOUNTER (OUTPATIENT)
Dept: LAB | Facility: REFERENCE LAB | Age: 35
End: 2020-08-20
Attending: OBSTETRICS & GYNECOLOGY
Payer: COMMERCIAL

## 2020-08-20 DIAGNOSIS — L81.8 DECORATIVE TATTOO: ICD-10-CM

## 2020-08-20 DIAGNOSIS — O09.521 MULTIGRAVIDA OF ADVANCED MATERNAL AGE IN FIRST TRIMESTER: ICD-10-CM

## 2020-08-20 LAB
ANTIBODY SCREEN: NEGATIVE
BASOPHILS # BLD AUTO: 0.03 X10(3) UL (ref 0–0.2)
BASOPHILS NFR BLD AUTO: 0.4 %
DEPRECATED RDW RBC AUTO: 43.4 FL (ref 35.1–46.3)
EOSINOPHIL # BLD AUTO: 0.17 X10(3) UL (ref 0–0.7)
EOSINOPHIL NFR BLD AUTO: 2.2 %
ERYTHROCYTE [DISTWIDTH] IN BLOOD BY AUTOMATED COUNT: 12 % (ref 11–15)
HBV SURFACE AG SER-ACNC: <0.1 [IU]/L
HBV SURFACE AG SERPL QL IA: NONREACTIVE
HCT VFR BLD AUTO: 35.6 % (ref 35–48)
HCV AB SERPL QL IA: NONREACTIVE
HGB BLD-MCNC: 12 G/DL (ref 12–16)
IMM GRANULOCYTES # BLD AUTO: 0.04 X10(3) UL (ref 0–1)
IMM GRANULOCYTES NFR BLD: 0.5 %
LYMPHOCYTES # BLD AUTO: 1.39 X10(3) UL (ref 1–4)
LYMPHOCYTES NFR BLD AUTO: 17.8 %
MCH RBC QN AUTO: 33.3 PG (ref 26–34)
MCHC RBC AUTO-ENTMCNC: 33.7 G/DL (ref 31–37)
MCV RBC AUTO: 98.9 FL (ref 80–100)
MONOCYTES # BLD AUTO: 0.71 X10(3) UL (ref 0.1–1)
MONOCYTES NFR BLD AUTO: 9.1 %
NEUTROPHILS # BLD AUTO: 5.49 X10 (3) UL (ref 1.5–7.7)
NEUTROPHILS # BLD AUTO: 5.49 X10(3) UL (ref 1.5–7.7)
NEUTROPHILS NFR BLD AUTO: 70 %
PLATELET # BLD AUTO: 389 10(3)UL (ref 150–450)
RBC # BLD AUTO: 3.6 X10(6)UL (ref 3.8–5.3)
RH BLOOD TYPE: POSITIVE
RUBV IGG SER QL: POSITIVE
RUBV IGG SER-ACNC: 84 IU/ML (ref 10–?)
TSI SER-ACNC: 0.12 MIU/ML (ref 0.36–3.74)
WBC # BLD AUTO: 7.8 X10(3) UL (ref 4–11)

## 2020-08-20 PROCEDURE — 86762 RUBELLA ANTIBODY: CPT

## 2020-08-20 PROCEDURE — 86901 BLOOD TYPING SEROLOGIC RH(D): CPT

## 2020-08-20 PROCEDURE — 86850 RBC ANTIBODY SCREEN: CPT

## 2020-08-20 PROCEDURE — 86900 BLOOD TYPING SEROLOGIC ABO: CPT

## 2020-08-20 PROCEDURE — 86803 HEPATITIS C AB TEST: CPT

## 2020-08-20 PROCEDURE — 85025 COMPLETE CBC W/AUTO DIFF WBC: CPT

## 2020-08-20 PROCEDURE — 86780 TREPONEMA PALLIDUM: CPT

## 2020-08-20 PROCEDURE — 87340 HEPATITIS B SURFACE AG IA: CPT

## 2020-08-20 PROCEDURE — 84443 ASSAY THYROID STIM HORMONE: CPT

## 2020-08-20 PROCEDURE — 36415 COLL VENOUS BLD VENIPUNCTURE: CPT

## 2020-08-20 PROCEDURE — 87389 HIV-1 AG W/HIV-1&-2 AB AG IA: CPT

## 2020-08-21 LAB — T PALLIDUM AB SER QL: NEGATIVE

## 2020-08-24 ENCOUNTER — TELEPHONE (OUTPATIENT)
Dept: OBGYN CLINIC | Facility: CLINIC | Age: 35
End: 2020-08-24

## 2020-08-24 NOTE — TELEPHONE ENCOUNTER
Cecille from Dr Rae Beltran office Endocrinology needs Labs that were done about 6 weeks ago faxed to [phone]

## 2020-09-01 ENCOUNTER — MED REC SCAN ONLY (OUTPATIENT)
Dept: OBGYN CLINIC | Facility: CLINIC | Age: 35
End: 2020-09-01

## 2020-09-01 ENCOUNTER — HOSPITAL ENCOUNTER (OUTPATIENT)
Age: 35
Discharge: HOME | End: 2020-09-01
Payer: COMMERCIAL

## 2020-09-01 ENCOUNTER — INITIAL PRENATAL (OUTPATIENT)
Dept: OBGYN CLINIC | Facility: CLINIC | Age: 35
End: 2020-09-01
Payer: COMMERCIAL

## 2020-09-01 VITALS
WEIGHT: 135 LBS | SYSTOLIC BLOOD PRESSURE: 124 MMHG | RESPIRATION RATE: 18 BRPM | HEART RATE: 98 BPM | HEIGHT: 61 IN | BODY MASS INDEX: 25.49 KG/M2 | OXYGEN SATURATION: 100 % | DIASTOLIC BLOOD PRESSURE: 77 MMHG | TEMPERATURE: 98 F

## 2020-09-01 VITALS
BODY MASS INDEX: 26 KG/M2 | WEIGHT: 138 LBS | DIASTOLIC BLOOD PRESSURE: 73 MMHG | SYSTOLIC BLOOD PRESSURE: 112 MMHG | HEART RATE: 109 BPM

## 2020-09-01 DIAGNOSIS — Z20.822 ENCOUNTER FOR LABORATORY TESTING FOR COVID-19 VIRUS: Primary | ICD-10-CM

## 2020-09-01 DIAGNOSIS — Z34.92 NORMAL PREGNANCY IN SECOND TRIMESTER: Primary | ICD-10-CM

## 2020-09-01 LAB
MULTISTIX LOT#: 1044 NUMERIC
PH, URINE: 7 (ref 4.5–8)
SPECIFIC GRAVITY: 1.01 (ref 1–1.03)
URINE-COLOR: YELLOW
UROBILINOGEN,SEMI-QN: 0.2 MG/DL (ref 0–1.9)

## 2020-09-01 PROCEDURE — 3078F DIAST BP <80 MM HG: CPT | Performed by: OBSTETRICS & GYNECOLOGY

## 2020-09-01 PROCEDURE — 81002 URINALYSIS NONAUTO W/O SCOPE: CPT | Performed by: OBSTETRICS & GYNECOLOGY

## 2020-09-01 PROCEDURE — 3074F SYST BP LT 130 MM HG: CPT | Performed by: OBSTETRICS & GYNECOLOGY

## 2020-09-01 PROCEDURE — 99213 OFFICE O/P EST LOW 20 MIN: CPT | Performed by: NURSE PRACTITIONER

## 2020-09-01 NOTE — PROGRESS NOTES
[facility], [facility]  Obstetrics and Gynecology  Prenatal Visit  Ramiro Youngblood MD    VIKTORIYA Juarez is a 28year old.o. [id number]  female with Patient's last menstrual period was 03/27/2020.   and with an estimated date of delivery of: 2/1/2021, by Dee Quinonez Quad - Trisomy 18 screen Risk Estimate - prior to 02/20/18        AFP Spina Bifida (Required questions in OE to answer )        QUAD/AFP - Interpretation        Free Fetal DNA         Genetic testing        Genetic testing        Genetic testing        GC • OTHER  2017    Gluteal Augmentation 2017 and Revision in 2018     Allergies     Latex                   RASH  Shrimp                  ANAPHYLAXIS  Medications     Current Outpatient Medications   Medication Sig Dispense Refill   • Prenatal Vit-Fe Fumarat Emotionally abused: Not on file        Physically abused: Not on file        Forced sexual activity: Not on file    Other Topics      Concerns:        Not on file    Social History Narrative      Not on file    Exam   /73   Pulse 109   Wt 138 l Pt counseled on option of MSAFP to screen for NTD such as spina bifida. I discussed that with an elevated MSAFP the initial follow up would be an US to confirm dating (which she has already had) and to evaluate for NTD.   If she has a negative US there is

## 2020-09-01 NOTE — ED PROVIDER NOTES
Patient Seen in: [address]      History   Patient presents with:  Testing    Stated Complaint: COVID TESTING    HPI    This is a well-appearing 72-year-old female who is currently 16 weeks pregnant who presents with a c Mucous membranes are moist.      Pharynx: Oropharynx is clear. Uvula midline. Eyes:      General: Lids are normal.      Extraocular Movements: Extraocular movements intact.       Conjunctiva/sclera: Conjunctivae normal.      Pupils: Pupils are equal, roun

## 2020-09-03 LAB
C TRACH DNA SPEC QL NAA+PROBE: NEGATIVE
N GONORRHOEA DNA SPEC QL NAA+PROBE: NEGATIVE

## 2020-09-04 LAB — SARS-COV-2 BY PCR: NOT DETECTED

## 2020-09-23 ENCOUNTER — HOSPITAL ENCOUNTER (OUTPATIENT)
Dept: PERINATAL CARE | Facility: HOSPITAL | Age: 35
Discharge: HOME | End: 2020-09-23
Attending: OBSTETRICS & GYNECOLOGY
Payer: COMMERCIAL

## 2020-09-23 ENCOUNTER — APPOINTMENT (OUTPATIENT)
Dept: OBGYN CLINIC | Facility: HOSPITAL | Age: 35
End: 2020-09-23
Attending: OBSTETRICS & GYNECOLOGY
Payer: COMMERCIAL

## 2020-09-23 ENCOUNTER — HOSPITAL ENCOUNTER (INPATIENT)
Facility: HOSPITAL | Age: 35
LOS: 1 days | Discharge: HOME | End: 2020-09-24
Attending: OBSTETRICS & GYNECOLOGY | Admitting: OBSTETRICS & GYNECOLOGY
Payer: COMMERCIAL

## 2020-09-23 VITALS
BODY MASS INDEX: 26 KG/M2 | DIASTOLIC BLOOD PRESSURE: 82 MMHG | HEART RATE: 97 BPM | SYSTOLIC BLOOD PRESSURE: 110 MMHG | WEIGHT: 139 LBS

## 2020-09-23 DIAGNOSIS — O09.522 MULTIGRAVIDA OF ADVANCED MATERNAL AGE IN SECOND TRIMESTER: ICD-10-CM

## 2020-09-23 DIAGNOSIS — O09.522 MULTIGRAVIDA OF ADVANCED MATERNAL AGE IN SECOND TRIMESTER: Primary | ICD-10-CM

## 2020-09-23 DIAGNOSIS — O36.4XX0 FETAL DEMISE BEFORE 22 WEEKS WITH RETENTION OF DEAD FETUS: ICD-10-CM

## 2020-09-23 PROBLEM — Z34.90 PREGNANCY: Status: ACTIVE | Noted: 2020-09-23

## 2020-09-23 PROBLEM — Z34.90 PREGNANCY (HCC): Status: ACTIVE | Noted: 2020-09-23

## 2020-09-23 PROCEDURE — 76815 OB US LIMITED FETUS(S): CPT | Performed by: OBSTETRICS & GYNECOLOGY

## 2020-09-23 PROCEDURE — 99213 OFFICE O/P EST LOW 20 MIN: CPT | Performed by: OBSTETRICS & GYNECOLOGY

## 2020-09-23 PROCEDURE — 76811 OB US DETAILED SNGL FETUS: CPT | Performed by: OBSTETRICS & GYNECOLOGY

## 2020-09-23 PROCEDURE — 3E0P7VZ INTRODUCTION OF HORMONE INTO FEMALE REPRODUCTIVE, VIA NATURAL OR ARTIFICIAL OPENING: ICD-10-PCS | Performed by: OBSTETRICS & GYNECOLOGY

## 2020-09-23 RX ORDER — MISOPROSTOL 100 UG/1
100 TABLET ORAL EVERY 6 HOURS
Status: DISCONTINUED | OUTPATIENT
Start: 2020-09-23 | End: 2020-09-24

## 2020-09-23 RX ORDER — DEXTROSE, SODIUM CHLORIDE, SODIUM LACTATE, POTASSIUM CHLORIDE, AND CALCIUM CHLORIDE 5; .6; .31; .03; .02 G/100ML; G/100ML; G/100ML; G/100ML; G/100ML
INJECTION, SOLUTION INTRAVENOUS CONTINUOUS
Status: DISCONTINUED | OUTPATIENT
Start: 2020-09-23 | End: 2020-09-24

## 2020-09-23 RX ORDER — ONDANSETRON 2 MG/ML
4 INJECTION INTRAMUSCULAR; INTRAVENOUS EVERY 6 HOURS PRN
Status: DISCONTINUED | OUTPATIENT
Start: 2020-09-23 | End: 2020-09-24

## 2020-09-23 RX ORDER — ACETAMINOPHEN 500 MG
500 TABLET ORAL EVERY 6 HOURS PRN
Status: DISCONTINUED | OUTPATIENT
Start: 2020-09-23 | End: 2020-09-24

## 2020-09-23 RX ORDER — IBUPROFEN 600 MG/1
600 TABLET ORAL EVERY 6 HOURS PRN
Status: DISCONTINUED | OUTPATIENT
Start: 2020-09-23 | End: 2020-09-24

## 2020-09-23 NOTE — PROGRESS NOTES
Reason for Consult:   Dear Dr. Willa Chatman ,    Thank you for requesting ultrasound evaluation and maternal fetal medicine consultation on Catrachita Puentes. As you are aware she is a 28year old female with a Street pregnancy at 13w3d.   A maternal-feta Laterality Date   • OTHER  2017    Gluteal Augmentation 2017 and Revision in 2018      Family History   Problem Relation Age of Onset   • Thyroid disease Father    • Thyroid disease Mother    • Breast Cancer Other 52        One of 5 sisters-none others wit such as parvovirus, toxoplasma, and CMV.   It has been estimated that infection is implicated in up to 14% of stillbirths at less than 28 weeks of gestation, but only 2% of stillbirths at term Placental abnormalities and cord accidents compose another group placental pathology, significant growth restriction, or in the setting of a family or personal history of thrombosis, factor V Leiden mutation, prothrombin mutation, antithrombin III level, MTHFR gene mutation, protein C activity, and protein S activity ma delivery, and the delivery plan should be individualized. IMPRESSION:   1. IUFD at 21 wks /   19wks size  2. AMA    RECOMMENDATIONS:   1. Discussed with Dr. Justine Cordova. Patient will go to L&D tonight.     Thank you for allowing me to participate in the

## 2020-09-23 NOTE — PROGRESS NOTES
Pt is a 28year old female admitted to 375/375-A. Patient presents with:  Scheduled Induction: iufd     Pt is  21w2d intra-uterine pregnancy. History obtained, consents signed. Oriented to room, staff, and plan of care.

## 2020-09-23 NOTE — PROGRESS NOTES
Pt for level 2 US  HX low risk cfdna  Denies pregnancy complaints  Pt travel to [place]  Neg covid testing 9/4

## 2020-09-24 VITALS
RESPIRATION RATE: 15 BRPM | HEART RATE: 83 BPM | TEMPERATURE: 98 F | SYSTOLIC BLOOD PRESSURE: 106 MMHG | DIASTOLIC BLOOD PRESSURE: 68 MMHG

## 2020-09-24 PROBLEM — O36.4XX0 FETAL DEMISE BEFORE 22 WEEKS WITH RETENTION OF DEAD FETUS: Status: ACTIVE | Noted: 2020-09-24

## 2020-09-24 PROBLEM — O36.4XX0 FETAL DEMISE BEFORE 22 WEEKS WITH RETENTION OF DEAD FETUS (HCC): Status: ACTIVE | Noted: 2020-09-24

## 2020-09-24 PROCEDURE — 59410 OBSTETRICAL CARE: CPT | Performed by: OBSTETRICS & GYNECOLOGY

## 2020-09-24 RX ORDER — ONDANSETRON 2 MG/ML
4 INJECTION INTRAMUSCULAR; INTRAVENOUS EVERY 6 HOURS PRN
Status: DISCONTINUED | OUTPATIENT
Start: 2020-09-24 | End: 2020-09-24

## 2020-09-24 RX ORDER — AMMONIA INHALANTS 0.04 G/.3ML
0.3 INHALANT RESPIRATORY (INHALATION) AS NEEDED
Status: DISCONTINUED | OUTPATIENT
Start: 2020-09-24 | End: 2020-09-24

## 2020-09-24 RX ORDER — IBUPROFEN 600 MG/1
600 TABLET ORAL EVERY 6 HOURS PRN
Status: DISCONTINUED | OUTPATIENT
Start: 2020-09-24 | End: 2020-09-24

## 2020-09-24 RX ORDER — DOCUSATE SODIUM 100 MG/1
100 CAPSULE, LIQUID FILLED ORAL 2 TIMES DAILY
Status: DISCONTINUED | OUTPATIENT
Start: 2020-09-24 | End: 2020-09-24

## 2020-09-24 RX ORDER — BISACODYL 10 MG
10 SUPPOSITORY, RECTAL RECTAL ONCE AS NEEDED
Status: DISCONTINUED | OUTPATIENT
Start: 2020-09-24 | End: 2020-09-24

## 2020-09-24 RX ORDER — ACETAMINOPHEN 325 MG/1
650 TABLET ORAL EVERY 6 HOURS PRN
Status: DISCONTINUED | OUTPATIENT
Start: 2020-09-24 | End: 2020-09-24

## 2020-09-24 NOTE — DISCHARGE PLANNING
Reviewed with patient and patient's mother that Dr. Samia Baird will sign paperwork for infant release tomorrow and infant can be picked up by  home after that.  Pt has decided to cremate infant through  home she found through a [employer] member, Ryan

## 2020-09-24 NOTE — PROGRESS NOTES
S: Pt lying in bed holding baby. Pt's mother in chair next to bed. O: Pt expressed anger and sadness at the loss her baby. Pt shared wishes and hopes for her child and said \"I just want her to know I love her. \" Pt and family asked about what happens n

## 2020-09-24 NOTE — H&P
[address] [facility] Patient Status:  Inpatient    1985 MRN [id number]   Location [address] [place] [address] Attending Sharyle Cabot, MD   Hosp Day # 0 [place] [facility]     Date of Hepatitis B Nonreactive   Nonreactive  08/20/20 1049    HIV Combo Non-Reactive  Non-Reactive 08/20/20 1049          Optional Initial Labs     Test Value Reference Range Date Time    TSH 0.119 mIU/mL 0.358 - 3.740 08/20/20 1049    HCV Nonreactive   Nonreac Cystic Fibrosis[32] (Required questions in OE to answer)        Cystic Fibrosis[165] (Required questions in OE to answer)        Cystic Fibrosis[165] (Required questions in OE to answer)        Cystic Fibrosis[165] (Required questions in OE to answer) Take 1 tablet every day by oral route., Disp: , Rfl:     •  valACYclovir HCl 500 MG Oral Tab, , Disp: , Rfl: 1    •  Rizatriptan Benzoate 10 MG Oral Tablet Dispersible, TK ONE T AT FIRST SIGN OF MIGRAINE MAY REPEAT IF SYMPTOMS PERSIST 2 HOURS LATER, Disp: Pulse:  [92-97] 93  Resp:  [16-18] 18  BP: (110-122)/(77-82) 113/77    Constitutional: alert, cooperative and Appropriately upset  Abdomen: Gravid, nontender uterus with fundal height just above the umbilicus.   A bedside ultrasound was done at patient's re Cytotec was placed.     Shawnee He MD  9/23/2020  9:13 PM

## 2020-09-24 NOTE — L&D DELIVERY NOTE
[facility] HOSP - [facility]    Vaginal Delivery Note    Giovanny Davis Patient Status:  Inpatient    1985 MRN [id number]   Location [address] [facility] Attending Jaja Garces MD   Hosp Day # 1 [facility] [address] [facility]

## 2020-09-25 ENCOUNTER — PATIENT MESSAGE (OUTPATIENT)
Dept: OBGYN CLINIC | Facility: CLINIC | Age: 35
End: 2020-09-25

## 2020-09-28 ENCOUNTER — TELEPHONE (OUTPATIENT)
Dept: OBGYN UNIT | Facility: HOSPITAL | Age: 35
End: 2020-09-28

## 2020-09-28 ENCOUNTER — TELEPHONE (OUTPATIENT)
Dept: OBGYN CLINIC | Facility: CLINIC | Age: 35
End: 2020-09-28

## 2020-09-28 NOTE — TELEPHONE ENCOUNTER
Patients mother/Sadia calling to ensure death certificate is signed, please update at:[phone],thanks.

## 2020-09-28 NOTE — TELEPHONE ENCOUNTER
Patient informed. No further question. Informed we were here if she wanted to reach out for assistance or resources. No further question.

## 2020-09-28 NOTE — PROGRESS NOTES
Pt called regarding photos. Spoke with patient for follow-up. Pt states that she feels like she is in a fog, cries sometimes, trying to take Benadryl to help her sleep because she is having trouble sleeping.  Pt encouraged to call MD if she needs medication

## 2020-09-28 NOTE — TELEPHONE ENCOUNTER
Patient delivered still born.  Home needs Dr Maria Alejandra Grider signature on the death certificate so they can pick her up. Please advise as soon as possible.

## 2020-09-28 NOTE — TELEPHONE ENCOUNTER
Spoke to the  home staff who stated she needed the signed death certificate so was on her way to [facility] to  the deseased and the death certificate to bring to [facility] for Dr. Ye Griffith to sign. MD message sent to Md and his nurse.

## 2020-09-30 ENCOUNTER — TELEPHONE (OUTPATIENT)
Dept: OBGYN UNIT | Facility: HOSPITAL | Age: 35
End: 2020-09-30

## 2020-10-01 ENCOUNTER — OFFICE VISIT (OUTPATIENT)
Dept: OBGYN CLINIC | Facility: CLINIC | Age: 35
End: 2020-10-01
Payer: COMMERCIAL

## 2020-10-01 VITALS
HEART RATE: 91 BPM | BODY MASS INDEX: 25 KG/M2 | SYSTOLIC BLOOD PRESSURE: 124 MMHG | DIASTOLIC BLOOD PRESSURE: 92 MMHG | WEIGHT: 132 LBS

## 2020-10-01 DIAGNOSIS — Z87.59 H/O FETAL DEMISE, NOT CURRENTLY PREGNANT: Primary | ICD-10-CM

## 2020-10-01 PROCEDURE — 99024 POSTOP FOLLOW-UP VISIT: CPT | Performed by: OBSTETRICS & GYNECOLOGY

## 2020-10-01 PROCEDURE — 3074F SYST BP LT 130 MM HG: CPT | Performed by: OBSTETRICS & GYNECOLOGY

## 2020-10-01 PROCEDURE — 3080F DIAST BP >= 90 MM HG: CPT | Performed by: OBSTETRICS & GYNECOLOGY

## 2020-10-03 PROBLEM — O09.529 AMA (ADVANCED MATERNAL AGE) MULTIGRAVIDA 35+: Status: RESOLVED | Noted: 2020-07-30 | Resolved: 2020-10-03

## 2020-10-03 PROBLEM — O09.529 AMA (ADVANCED MATERNAL AGE) MULTIGRAVIDA 35+ (HCC): Status: RESOLVED | Noted: 2020-07-30 | Resolved: 2020-10-03

## 2020-10-03 NOTE — PROGRESS NOTES
[address]  Obstetrics and Gynecology  Focused Gynecology Problem Exam  Risa Martinez MD    Mike Collins is a 28year old female presenting for Follow - Up  . HPI:   Patient presents with:   Follow - Up    Patient is here today to follow-up o History:   Procedure Laterality Date   • OTHER  2017    Gluteal Augmentation 2017 and Revision in 2018       Family History   Problem Relation Age of Onset   • Thyroid disease Father    • Thyroid disease Mother    • Breast Cancer Other 52        One of 5 s Narrative      Not on file      ROS:   See HPI  PHYSICAL EXAM:   BP (!) 124/92   Pulse 91   Wt 132 lb (59.9 kg)   LMP 03/27/2020   BMI 24.94 kg/m²     GENERAL: well developed, well nourished, in no apparent distress  ASSESSMENT:    A: 28 y.o. [id number] who i

## 2020-10-06 ENCOUNTER — TELEPHONE (OUTPATIENT)
Dept: ADMINISTRATIVE | Age: 35
End: 2020-10-06

## 2020-10-07 NOTE — TELEPHONE ENCOUNTER
Dr. Raj Luong,     Please sign off on form:  -Highlight the patient and hit \"Chart\" button.   -In Chart Review, w/in the Encounter tab - click 1 time on the Telephone call encounter for 10/6/20 Scroll down the telephone encounter.  -Click \"scan on\" blue Hy

## 2020-10-14 ENCOUNTER — TELEPHONE (OUTPATIENT)
Dept: OBGYN UNIT | Facility: HOSPITAL | Age: 35
End: 2020-10-14

## 2020-10-14 NOTE — PROGRESS NOTES
Received 2 phone calls on 10/5/20 and 10/9/20 from patient asking about photos. Pt had not received them as of yet. Spoke to patient today and she still has not received photos.  Upon reviewing address with patient the apartment number is wrong in the syste

## 2020-10-22 ENCOUNTER — TELEPHONE (OUTPATIENT)
Dept: OBGYN UNIT | Facility: HOSPITAL | Age: 35
End: 2020-10-22

## 2020-10-22 NOTE — PROGRESS NOTES
Spoke with patient on phone. Pt received photos and flash drive. Pt very happy to have photos now. She is back to work and taking grief one day at a time. Pt will call if she needs to talk before next follow up call.

## 2020-10-26 NOTE — DISCHARGE SUMMARY
[facility]    Discharge Summary/Discharge Note    Nathaniel Fees Kingston Patient Status:  Inpatient    1985 MRN [id number]   Location [address] Attending No att. providers found   Hosp Day # 1 PCP Rosalva Wilson 4:44 AM  Delivery Type: Normal spontaneous vaginal delivery  Maternal Anesthesia: IV sedation   Episiotomy/Laceration Repair NONE  Antepartum complications: AMA-NL cfDNA testing  IUFD prior to 22 weeks  Delivered By: [address] [place] [address]

## 2020-10-27 ENCOUNTER — OFFICE VISIT (OUTPATIENT)
Dept: OBGYN CLINIC | Facility: CLINIC | Age: 35
End: 2020-10-27
Payer: COMMERCIAL

## 2020-10-27 VITALS
DIASTOLIC BLOOD PRESSURE: 82 MMHG | HEART RATE: 98 BPM | SYSTOLIC BLOOD PRESSURE: 114 MMHG | BODY MASS INDEX: 26 KG/M2 | WEIGHT: 135 LBS

## 2020-10-27 DIAGNOSIS — Z87.59 H/O FETAL DEMISE, NOT CURRENTLY PREGNANT: ICD-10-CM

## 2020-10-27 PROCEDURE — 3079F DIAST BP 80-89 MM HG: CPT | Performed by: OBSTETRICS & GYNECOLOGY

## 2020-10-27 PROCEDURE — 3074F SYST BP LT 130 MM HG: CPT | Performed by: OBSTETRICS & GYNECOLOGY

## 2020-10-28 PROBLEM — O36.4XX0 FETAL DEMISE BEFORE 22 WEEKS WITH RETENTION OF DEAD FETUS (HCC): Status: RESOLVED | Noted: 2020-09-24 | Resolved: 2020-10-28

## 2020-10-28 PROBLEM — Z34.90 PREGNANCY: Status: RESOLVED | Noted: 2020-09-23 | Resolved: 2020-10-28

## 2020-10-28 PROBLEM — Z34.90 PREGNANCY (HCC): Status: RESOLVED | Noted: 2020-09-23 | Resolved: 2020-10-28

## 2020-10-28 PROBLEM — Z87.59 H/O FETAL DEMISE, NOT CURRENTLY PREGNANT: Status: ACTIVE | Noted: 2020-10-28

## 2020-10-28 PROBLEM — O36.4XX0 FETAL DEMISE BEFORE 22 WEEKS WITH RETENTION OF DEAD FETUS: Status: RESOLVED | Noted: 2020-09-24 | Resolved: 2020-10-28

## 2020-10-28 NOTE — PROGRESS NOTES
VIKTORIYA    Iesha Dickerson is a 28year old female  here for post-partum visit. Patient delivered a 21 week size female infant with fetal demise on 2020 via vaginal delivery after Cytotec induction of labor.       Pt states she is voiding freely, ANAPHYLAXIS    PHYSICAL EXAM  Blood pressure 114/82, pulse 98, weight 135 lb (61.2 kg), last menstrual period 03/27/2020, not currently breastfeeding.   General:  Well nourished, well developed woman in no acute distress  Abdomen:  soft, nontender, no desiree

## 2020-12-23 ENCOUNTER — TELEPHONE (OUTPATIENT)
Dept: OBGYN UNIT | Facility: HOSPITAL | Age: 35
End: 2020-12-23

## 2020-12-23 NOTE — PROGRESS NOTES
Spoke with patient for Share follow up. She states that it's been rough but she is trying her best. She has counseling but is thinking about joining the Share group.  Pt encouraged to call if she needs to talk between follow up calls, verbalizes understandi

## 2021-01-06 ENCOUNTER — HOSPITAL ENCOUNTER (OUTPATIENT)
Age: 36
Discharge: HOME | End: 2021-01-06
Payer: COMMERCIAL

## 2021-01-06 VITALS
BODY MASS INDEX: 25.49 KG/M2 | DIASTOLIC BLOOD PRESSURE: 84 MMHG | HEART RATE: 97 BPM | RESPIRATION RATE: 18 BRPM | WEIGHT: 135 LBS | TEMPERATURE: 98 F | SYSTOLIC BLOOD PRESSURE: 118 MMHG | HEIGHT: 61 IN | OXYGEN SATURATION: 100 %

## 2021-01-06 DIAGNOSIS — L50.9 URTICARIA: Primary | ICD-10-CM

## 2021-01-06 DIAGNOSIS — R19.7 DIARRHEA, UNSPECIFIED TYPE: ICD-10-CM

## 2021-01-06 DIAGNOSIS — R10.9 ABDOMINAL CRAMPING: ICD-10-CM

## 2021-01-06 LAB
B-HCG UR QL: NEGATIVE
BILIRUB UR QL STRIP: NEGATIVE
CLARITY UR: CLEAR
GLUCOSE UR STRIP-MCNC: NEGATIVE MG/DL
HGB UR QL STRIP: NEGATIVE
KETONES UR STRIP-MCNC: NEGATIVE MG/DL
LEUKOCYTE ESTERASE UR QL STRIP: NEGATIVE
NITRITE UR QL STRIP: NEGATIVE
PH UR STRIP: 5 [PH]
PROT UR STRIP-MCNC: NEGATIVE MG/DL
SP GR UR STRIP: >=1.03
UROBILINOGEN UR STRIP-ACNC: <2 MG/DL

## 2021-01-06 PROCEDURE — 81025 URINE PREGNANCY TEST: CPT | Performed by: NURSE PRACTITIONER

## 2021-01-06 PROCEDURE — 81002 URINALYSIS NONAUTO W/O SCOPE: CPT | Performed by: NURSE PRACTITIONER

## 2021-01-06 PROCEDURE — 99213 OFFICE O/P EST LOW 20 MIN: CPT | Performed by: NURSE PRACTITIONER

## 2021-01-06 RX ORDER — PREDNISONE 20 MG/1
40 TABLET ORAL DAILY
Qty: 8 TABLET | Refills: 0 | Status: SHIPPED | OUTPATIENT
Start: 2021-01-07 | End: 2021-01-11

## 2021-01-06 RX ORDER — PREDNISONE 20 MG/1
60 TABLET ORAL ONCE
Status: COMPLETED | OUTPATIENT
Start: 2021-01-06 | End: 2021-01-06

## 2021-01-06 NOTE — ED PROVIDER NOTES
.  Patient Seen in: [facility]      History   Patient presents with:  Rash Skin Problem  Nausea/Vomiting/Diarrhea    Stated Complaint: Itching rash, upset stomach    HPI/Subjective:   HPI    This is a 15-year-old female presenting with body ra Normocephalic. Right Ear: External ear normal.      Left Ear: External ear normal.      Nose: Nose normal.      Mouth/Throat:      Mouth: Mucous membranes are moist.      Pharynx: Oropharynx is clear.       Comments: No angioedema  Eyes:      Conjuncti Discussed hypoallergenic products and avoiding any new foods or drinks. Discussed any new or worsening symptoms go to the emergency department. Discussed following up with PCP and dermatology. All questions and concerns answered for this patient.  Patient a

## 2021-01-06 NOTE — ED INITIAL ASSESSMENT (HPI)
Pt states having an itchy rash that she believes could be an allergic reaction. Pt states something like this happen long time ago. Pt does have an epipen just incase. Pt states did eat at a new restaurant 3 days ago before this began.  Pt states also havin

## 2021-01-19 ENCOUNTER — TELEPHONE (OUTPATIENT)
Dept: OBGYN UNIT | Facility: HOSPITAL | Age: 36
End: 2021-01-19

## 2021-01-19 NOTE — PROGRESS NOTES
Spoke with patient for Share follow up, Juan 39 is 2/1. Pt is having a bad day, friend at [employer] with a similar due date just had her baby. Patient states that she is having good days as well, but took time off of work for now with Juan 39 coming up.  Patient very

## 2021-03-16 ENCOUNTER — OFFICE VISIT (OUTPATIENT)
Dept: OBGYN CLINIC | Facility: CLINIC | Age: 36
End: 2021-03-16
Payer: COMMERCIAL

## 2021-03-16 VITALS — BODY MASS INDEX: 27 KG/M2 | DIASTOLIC BLOOD PRESSURE: 81 MMHG | WEIGHT: 142 LBS | SYSTOLIC BLOOD PRESSURE: 131 MMHG

## 2021-03-16 DIAGNOSIS — Z32.01 PREGNANCY EXAMINATION OR TEST, POSITIVE RESULT: ICD-10-CM

## 2021-03-16 DIAGNOSIS — N92.6 MISSED MENSES: Primary | ICD-10-CM

## 2021-03-16 LAB
CONTROL LINE PRESENT WITH A CLEAR BACKGROUND (YES/NO): YES YES/NO
PREGNANCY TEST, URINE: POSITIVE

## 2021-03-16 PROCEDURE — 3075F SYST BP GE 130 - 139MM HG: CPT | Performed by: OBSTETRICS & GYNECOLOGY

## 2021-03-16 PROCEDURE — 3079F DIAST BP 80-89 MM HG: CPT | Performed by: OBSTETRICS & GYNECOLOGY

## 2021-03-16 PROCEDURE — 99213 OFFICE O/P EST LOW 20 MIN: CPT | Performed by: OBSTETRICS & GYNECOLOGY

## 2021-03-16 PROCEDURE — 81025 URINE PREGNANCY TEST: CPT | Performed by: OBSTETRICS & GYNECOLOGY

## 2021-03-16 NOTE — PROGRESS NOTES
[facility], [facility]  Obstetrics and Gynecology  Pregnancy Confirmation  Dasia Quijano MD    HPI     Katia Lares is a 28year old  with Patient's last menstrual period was 2020. who presents for pregnancy confirmation.      Patient had a pos Family History   Problem Relation Age of Onset   • Thyroid disease Father    • Thyroid disease Mother    • Breast Cancer Other 52        One of 5 sisters-none others with breast CA   • Cancer Maternal Grandmother    • No Known Problems Maternal [place]

## 2021-03-24 ENCOUNTER — PATIENT MESSAGE (OUTPATIENT)
Dept: OBGYN CLINIC | Facility: CLINIC | Age: 36
End: 2021-03-24

## 2021-03-24 NOTE — TELEPHONE ENCOUNTER
From: Edward Kingston  To: Tim Campos MD, MD  Sent: 3/24/2021 2:25 PM CDT  Subject: Non-Urgent Medical Question    Hi Dr. Samia Baird. I hope that you're doing well.  I've been having headaches that last all day and I'm not sure if that's normal. I've t

## 2021-03-24 NOTE — TELEPHONE ENCOUNTER
OB History     T0    L0    SAB2  TAB1  Ectopic0  Multiple0  Live Births0     Patient name and  verified. Patient Radha Pavon by LMP complaining of tension headache 1 day ago. Patient currently denies pain.  Advised patient she can take tylenol r

## 2021-03-27 ENCOUNTER — APPOINTMENT (OUTPATIENT)
Dept: ULTRASOUND IMAGING | Facility: HOSPITAL | Age: 36
End: 2021-03-27
Attending: EMERGENCY MEDICINE
Payer: COMMERCIAL

## 2021-03-27 ENCOUNTER — HOSPITAL ENCOUNTER (EMERGENCY)
Facility: HOSPITAL | Age: 36
Discharge: HOME | End: 2021-03-27
Attending: EMERGENCY MEDICINE
Payer: COMMERCIAL

## 2021-03-27 VITALS
SYSTOLIC BLOOD PRESSURE: 122 MMHG | BODY MASS INDEX: 25.86 KG/M2 | HEIGHT: 61 IN | OXYGEN SATURATION: 98 % | WEIGHT: 137 LBS | TEMPERATURE: 98 F | DIASTOLIC BLOOD PRESSURE: 77 MMHG | HEART RATE: 87 BPM | RESPIRATION RATE: 18 BRPM

## 2021-03-27 DIAGNOSIS — O20.0 THREATENED ABORTION: Primary | ICD-10-CM

## 2021-03-27 LAB
B-HCG SERPL-ACNC: 1669 MIU/ML
B-HCG UR QL: POSITIVE
BILIRUB UR QL: NEGATIVE
CLARITY UR: CLEAR
COLOR UR: YELLOW
DEPRECATED RDW RBC AUTO: 42.2 FL (ref 35.1–46.3)
ERYTHROCYTE [DISTWIDTH] IN BLOOD BY AUTOMATED COUNT: 11.8 % (ref 11–15)
GLUCOSE UR-MCNC: NEGATIVE MG/DL
HCT VFR BLD AUTO: 39.4 %
HGB BLD-MCNC: 13.4 G/DL
LEUKOCYTE ESTERASE UR QL STRIP.AUTO: NEGATIVE
MCH RBC QN AUTO: 33.3 PG (ref 26–34)
MCHC RBC AUTO-ENTMCNC: 34 G/DL (ref 31–37)
MCV RBC AUTO: 97.8 FL
NITRITE UR QL STRIP.AUTO: NEGATIVE
PH UR: 6 [PH] (ref 5–8)
PLATELET # BLD AUTO: 358 10(3)UL (ref 150–450)
PROT UR-MCNC: NEGATIVE MG/DL
RBC # BLD AUTO: 4.03 X10(6)UL
RH BLOOD TYPE: POSITIVE
SP GR UR STRIP: 1.01 (ref 1–1.03)
UROBILINOGEN UR STRIP-ACNC: <2
WBC # BLD AUTO: 4.9 X10(3) UL (ref 4–11)

## 2021-03-27 PROCEDURE — 84702 CHORIONIC GONADOTROPIN TEST: CPT | Performed by: EMERGENCY MEDICINE

## 2021-03-27 PROCEDURE — 86901 BLOOD TYPING SEROLOGIC RH(D): CPT | Performed by: EMERGENCY MEDICINE

## 2021-03-27 PROCEDURE — 76801 OB US < 14 WKS SINGLE FETUS: CPT | Performed by: EMERGENCY MEDICINE

## 2021-03-27 PROCEDURE — 81001 URINALYSIS AUTO W/SCOPE: CPT | Performed by: EMERGENCY MEDICINE

## 2021-03-27 PROCEDURE — 86900 BLOOD TYPING SEROLOGIC ABO: CPT | Performed by: EMERGENCY MEDICINE

## 2021-03-27 PROCEDURE — 76817 TRANSVAGINAL US OBSTETRIC: CPT | Performed by: EMERGENCY MEDICINE

## 2021-03-27 PROCEDURE — 85027 COMPLETE CBC AUTOMATED: CPT | Performed by: EMERGENCY MEDICINE

## 2021-03-27 PROCEDURE — 36415 COLL VENOUS BLD VENIPUNCTURE: CPT

## 2021-03-27 PROCEDURE — 99284 EMERGENCY DEPT VISIT MOD MDM: CPT

## 2021-03-27 PROCEDURE — 81025 URINE PREGNANCY TEST: CPT

## 2021-03-27 NOTE — ED PROVIDER NOTES
Patient Seen in: [facility] AND [facility] Emergency Department      History   Patient presents with:  Pregnancy Issues    Stated Complaint: 9 weeks pregnant/spotting    HPI/Subjective:   HPI    Patient is a 79-year-old female that believes she is about 9 weeks Normal rate, regular rhythm, normal heart sounds and intact distal pulses. Pulmonary/Chest: Effort normal and breath sounds normal. No respiratory distress. Abdominal: Soft. Bowel sounds are normal. Exhibits no distension and no mass.  There is no tend (CST): Cayetano Valerio MD on 3/27/2021 at 2:00 PM                   MDM                               Disposition and Plan     Clinical Impression:  Threatened   (primary encounter diagnosis)    Disposition:  Discharge  3/27/2021  2:36 pm

## 2021-03-27 NOTE — ED NOTES
Patient felt urge to urinate and was marked ready for ultrasound. While waiting for ultrasound patient stated she used the bathroom to relieve some pressure but still feels the urge to urinate. Fluids running.

## 2021-03-27 NOTE — ED INITIAL ASSESSMENT (HPI)
Patient to ED for light pink spotting that began this morning. . Patient is approximately 9 weeks pregnant. Denies pain.

## 2021-03-28 ENCOUNTER — HOSPITAL ENCOUNTER (EMERGENCY)
Facility: HOSPITAL | Age: 36
Discharge: HOME | End: 2021-03-28
Attending: EMERGENCY MEDICINE
Payer: COMMERCIAL

## 2021-03-28 ENCOUNTER — APPOINTMENT (OUTPATIENT)
Dept: ULTRASOUND IMAGING | Facility: HOSPITAL | Age: 36
End: 2021-03-28
Attending: EMERGENCY MEDICINE
Payer: COMMERCIAL

## 2021-03-28 VITALS
OXYGEN SATURATION: 98 % | DIASTOLIC BLOOD PRESSURE: 81 MMHG | BODY MASS INDEX: 25.86 KG/M2 | HEART RATE: 82 BPM | SYSTOLIC BLOOD PRESSURE: 117 MMHG | HEIGHT: 61 IN | RESPIRATION RATE: 17 BRPM | TEMPERATURE: 98 F | WEIGHT: 137 LBS

## 2021-03-28 DIAGNOSIS — O03.9 MISCARRIAGE: Primary | ICD-10-CM

## 2021-03-28 LAB
ANION GAP SERPL CALC-SCNC: 6 MMOL/L (ref 0–18)
B-HCG SERPL-ACNC: 1409 MIU/ML
BASOPHILS # BLD AUTO: 0.03 X10(3) UL (ref 0–0.2)
BASOPHILS NFR BLD AUTO: 0.5 %
BILIRUB UR QL: NEGATIVE
BUN BLD-MCNC: 13 MG/DL (ref 7–18)
BUN/CREAT SERPL: 14.4 (ref 10–20)
CALCIUM BLD-MCNC: 8.4 MG/DL (ref 8.5–10.1)
CHLORIDE SERPL-SCNC: 106 MMOL/L (ref 98–112)
CLARITY UR: CLEAR
CO2 SERPL-SCNC: 25 MMOL/L (ref 21–32)
CREAT BLD-MCNC: 0.9 MG/DL
DEPRECATED RDW RBC AUTO: 41.1 FL (ref 35.1–46.3)
EOSINOPHIL # BLD AUTO: 0.19 X10(3) UL (ref 0–0.7)
EOSINOPHIL NFR BLD AUTO: 3.1 %
ERYTHROCYTE [DISTWIDTH] IN BLOOD BY AUTOMATED COUNT: 11.7 % (ref 11–15)
GLUCOSE BLD-MCNC: 87 MG/DL (ref 70–99)
GLUCOSE UR-MCNC: NEGATIVE MG/DL
HCT VFR BLD AUTO: 36.7 %
HGB BLD-MCNC: 12.5 G/DL
IMM GRANULOCYTES # BLD AUTO: 0.02 X10(3) UL (ref 0–1)
IMM GRANULOCYTES NFR BLD: 0.3 %
KETONES UR-MCNC: NEGATIVE MG/DL
LEUKOCYTE ESTERASE UR QL STRIP.AUTO: NEGATIVE
LYMPHOCYTES # BLD AUTO: 2.03 X10(3) UL (ref 1–4)
LYMPHOCYTES NFR BLD AUTO: 32.6 %
MCH RBC QN AUTO: 33 PG (ref 26–34)
MCHC RBC AUTO-ENTMCNC: 34.1 G/DL (ref 31–37)
MCV RBC AUTO: 96.8 FL
MONOCYTES # BLD AUTO: 0.51 X10(3) UL (ref 0.1–1)
MONOCYTES NFR BLD AUTO: 8.2 %
NEUTROPHILS # BLD AUTO: 3.44 X10 (3) UL (ref 1.5–7.7)
NEUTROPHILS # BLD AUTO: 3.44 X10(3) UL (ref 1.5–7.7)
NEUTROPHILS NFR BLD AUTO: 55.3 %
NITRITE UR QL STRIP.AUTO: NEGATIVE
OSMOLALITY SERPL CALC.SUM OF ELEC: 283 MOSM/KG (ref 275–295)
PH UR: 7 [PH] (ref 5–8)
PLATELET # BLD AUTO: 308 10(3)UL (ref 150–450)
POTASSIUM SERPL-SCNC: 3.5 MMOL/L (ref 3.5–5.1)
PROT UR-MCNC: 30 MG/DL
RBC # BLD AUTO: 3.79 X10(6)UL
RBC #/AREA URNS AUTO: >10 /HPF
SODIUM SERPL-SCNC: 137 MMOL/L (ref 136–145)
SP GR UR STRIP: 1 (ref 1–1.03)
UROBILINOGEN UR STRIP-ACNC: <2
WBC # BLD AUTO: 6.2 X10(3) UL (ref 4–11)

## 2021-03-28 PROCEDURE — 84702 CHORIONIC GONADOTROPIN TEST: CPT | Performed by: EMERGENCY MEDICINE

## 2021-03-28 PROCEDURE — 81001 URINALYSIS AUTO W/SCOPE: CPT | Performed by: EMERGENCY MEDICINE

## 2021-03-28 PROCEDURE — 99284 EMERGENCY DEPT VISIT MOD MDM: CPT

## 2021-03-28 PROCEDURE — 88305 TISSUE EXAM BY PATHOLOGIST: CPT | Performed by: EMERGENCY MEDICINE

## 2021-03-28 PROCEDURE — 85025 COMPLETE CBC W/AUTO DIFF WBC: CPT | Performed by: EMERGENCY MEDICINE

## 2021-03-28 PROCEDURE — 76801 OB US < 14 WKS SINGLE FETUS: CPT | Performed by: EMERGENCY MEDICINE

## 2021-03-28 PROCEDURE — 76817 TRANSVAGINAL US OBSTETRIC: CPT | Performed by: EMERGENCY MEDICINE

## 2021-03-28 PROCEDURE — 96374 THER/PROPH/DIAG INJ IV PUSH: CPT

## 2021-03-28 PROCEDURE — 80048 BASIC METABOLIC PNL TOTAL CA: CPT | Performed by: EMERGENCY MEDICINE

## 2021-03-28 RX ORDER — MORPHINE SULFATE 4 MG/ML
2 INJECTION, SOLUTION INTRAMUSCULAR; INTRAVENOUS ONCE
Status: COMPLETED | OUTPATIENT
Start: 2021-03-28 | End: 2021-03-28

## 2021-03-29 ENCOUNTER — TELEPHONE (OUTPATIENT)
Dept: OBGYN CLINIC | Facility: CLINIC | Age: 36
End: 2021-03-29

## 2021-03-29 NOTE — ED NOTES
The patient is cleared for discharge per Emergency Department physician. Discharge instructions were reviewed with patient including when and how to follow up with healthcare providers and when to seek emergency care. Peripheral IV discontinued.  The flakita

## 2021-03-29 NOTE — TELEPHONE ENCOUNTER
03/29/2021 Pt reschedule for 04/13/21 at [facility] at 6pm.   Pr pt she would like for Dr. Genia Rodriguez to give her a call with her test results from 03/28/21.

## 2021-03-29 NOTE — ED PROVIDER NOTES
Patient Seen in: [facility] AND [facility] Emergency Department      History   Patient presents with:  Pregnancy Issues    Stated Complaint: Pregnancy Issue    HPI/Subjective:   HPI  Patient is a 72-year-old pregnant female  early ega based on ultrasound °C)   Temp src Temporal   SpO2 99 %   O2 Device None (Room air)       Current:/81   Pulse 82   Temp 98.2 °F (36.8 °C) (Temporal)   Resp 17   Ht 154.9 cm (5' 1\")   Wt 62.1 kg   LMP 01/21/2021 (Exact Date)   SpO2 98%   BMI 25.89 kg/m²         Physical 1,409.0 (*)     All other components within normal limits   URINALYSIS WITH CULTURE REFLEX - Abnormal; Notable for the following components:    Urine Color Other (*)     Blood Urine Large (*)     Protein Urine 30  (*)     RBC Urine >10 (*)     Bacteria Hardy Ill Previously identified gestational sac is now not present. Consistent with complete miscarriage. Patient discharged in stable condition with supportive care. Given return precautions. Has an appointment with her OB tomorrow.                  Dispositi

## 2021-03-29 NOTE — ED INITIAL ASSESSMENT (HPI)
Heavy vaginal bleeding, 9 weeks pregnant. Here yesterday for spotting, but now the bleeding is heavier. .

## 2021-04-06 ENCOUNTER — HOSPITAL ENCOUNTER (OUTPATIENT)
Age: 36
Discharge: HOME | End: 2021-04-06
Payer: COMMERCIAL

## 2021-04-06 VITALS
SYSTOLIC BLOOD PRESSURE: 107 MMHG | OXYGEN SATURATION: 100 % | RESPIRATION RATE: 18 BRPM | TEMPERATURE: 99 F | HEART RATE: 103 BPM | DIASTOLIC BLOOD PRESSURE: 74 MMHG

## 2021-04-06 DIAGNOSIS — J02.9 ACUTE VIRAL PHARYNGITIS: Primary | ICD-10-CM

## 2021-04-06 DIAGNOSIS — Z20.822 ENCOUNTER FOR SCREENING LABORATORY TESTING FOR COVID-19 VIRUS: ICD-10-CM

## 2021-04-06 PROCEDURE — U0002 COVID-19 LAB TEST NON-CDC: HCPCS | Performed by: NURSE PRACTITIONER

## 2021-04-06 PROCEDURE — 87880 STREP A ASSAY W/OPTIC: CPT | Performed by: NURSE PRACTITIONER

## 2021-04-06 PROCEDURE — 99213 OFFICE O/P EST LOW 20 MIN: CPT | Performed by: NURSE PRACTITIONER

## 2021-04-06 RX ORDER — DEXAMETHASONE SODIUM PHOSPHATE 10 MG/ML
10 INJECTION, SOLUTION INTRAMUSCULAR; INTRAVENOUS ONCE
Status: COMPLETED | OUTPATIENT
Start: 2021-04-06 | End: 2021-04-06

## 2021-04-06 NOTE — ED PROVIDER NOTES
Patient Seen in: [facility]      History   Patient presents with:  Sore Throat    Stated Complaint: Sore throat    HPI/Subjective:   HPI    This is a 27-year-old female presenting with a sore throat.   Patient states, she has had sore throat Cardiovascular:      Rate and Rhythm: Normal rate. Heart sounds: Normal heart sounds. Pulmonary:      Effort: Pulmonary effort is normal.      Breath sounds: Normal breath sounds. Musculoskeletal:         General: Normal range of motion.       Ce List

## 2021-04-08 ENCOUNTER — TELEPHONE (OUTPATIENT)
Dept: FAMILY MEDICINE CLINIC | Facility: CLINIC | Age: 36
End: 2021-04-08

## 2021-04-08 NOTE — TELEPHONE ENCOUNTER
Pt called triage line regarding throat culture result. Hasn't checked MyChart yet. Informed of negative result. Reports symptoms are improving.

## 2021-04-13 ENCOUNTER — OFFICE VISIT (OUTPATIENT)
Dept: OBGYN CLINIC | Facility: CLINIC | Age: 36
End: 2021-04-13
Payer: COMMERCIAL

## 2021-04-13 VITALS
SYSTOLIC BLOOD PRESSURE: 123 MMHG | BODY MASS INDEX: 26 KG/M2 | HEART RATE: 92 BPM | WEIGHT: 138 LBS | DIASTOLIC BLOOD PRESSURE: 90 MMHG

## 2021-04-13 DIAGNOSIS — O03.9 COMPLETE MISCARRIAGE: Primary | ICD-10-CM

## 2021-04-13 DIAGNOSIS — O26.20 RECURRENT PREGNANCY LOSS, ANTEPARTUM CONDITION OR COMPLICATION: ICD-10-CM

## 2021-04-13 PROCEDURE — 99213 OFFICE O/P EST LOW 20 MIN: CPT | Performed by: OBSTETRICS & GYNECOLOGY

## 2021-04-13 PROCEDURE — 81025 URINE PREGNANCY TEST: CPT | Performed by: OBSTETRICS & GYNECOLOGY

## 2021-04-13 PROCEDURE — 3080F DIAST BP >= 90 MM HG: CPT | Performed by: OBSTETRICS & GYNECOLOGY

## 2021-04-13 PROCEDURE — 3074F SYST BP LT 130 MM HG: CPT | Performed by: OBSTETRICS & GYNECOLOGY

## 2021-04-20 NOTE — PROGRESS NOTES
[facility]  Obstetrics and Gynecology  Focused Gynecology Problem Exam  MD Darrell Arreitastan Deleon is a 28year old female presenting for Follow - Up (after miscarriage, UCG-Negative)  . HPI:   Patient presents with:   Follow - Up: afte • Ovarian Cancer Neg    • Colon Cancer Neg        Social History    Socioeconomic History      Marital status: Single      Spouse name: Not on file      Number of children: Not on file      Years of education: Not on file      Highest education level: N diagnosis)  Plan: URINE PREGNANCY TEST    (O26.20) Recurrent pregnancy loss, antepartum condition or complication  Plan: LUPUS ANTICOAGULANT COMP, BETA-2 GLYCOPROTEIN I        AB,G,A,M, CHROMOSOME ANALYSIS, BLD W REFLEX TO         GENOMIC MICROARRAY, ANTIC

## 2021-04-23 ENCOUNTER — LAB ENCOUNTER (OUTPATIENT)
Dept: LAB | Age: 36
End: 2021-04-23
Attending: OBSTETRICS & GYNECOLOGY
Payer: COMMERCIAL

## 2021-04-23 DIAGNOSIS — O26.20 RECURRENT PREGNANCY LOSS, ANTEPARTUM CONDITION OR COMPLICATION: ICD-10-CM

## 2021-04-23 PROCEDURE — 88262 CHROMOSOME ANALYSIS 15-20: CPT

## 2021-04-23 PROCEDURE — 85730 THROMBOPLASTIN TIME PARTIAL: CPT

## 2021-04-23 PROCEDURE — 88237 TISSUE CULTURE BONE MARROW: CPT

## 2021-04-23 PROCEDURE — 85598 HEXAGNAL PHOSPH PLTLT NEUTRL: CPT

## 2021-04-23 PROCEDURE — 85610 PROTHROMBIN TIME: CPT

## 2021-04-23 PROCEDURE — 85390 FIBRINOLYSINS SCREEN I&R: CPT

## 2021-04-23 PROCEDURE — 81229 CYTOG ALYS CHRML ABNR SNPCGH: CPT

## 2021-04-23 PROCEDURE — 85613 RUSSELL VIPER VENOM DILUTED: CPT

## 2021-04-23 PROCEDURE — 86146 BETA-2 GLYCOPROTEIN ANTIBODY: CPT

## 2021-04-23 PROCEDURE — 36415 COLL VENOUS BLD VENIPUNCTURE: CPT

## 2021-04-23 PROCEDURE — 86147 CARDIOLIPIN ANTIBODY EA IG: CPT | Performed by: OBSTETRICS & GYNECOLOGY

## 2021-04-27 ENCOUNTER — PATIENT MESSAGE (OUTPATIENT)
Dept: OBGYN CLINIC | Facility: CLINIC | Age: 36
End: 2021-04-27

## 2021-04-30 NOTE — TELEPHONE ENCOUNTER
I contacted patient and discussed her results. We are still waiting on chromosomal analysis/karyotype.

## 2021-04-30 NOTE — TELEPHONE ENCOUNTER
Ellin Curling, RN 4/27/2021 1:21 PM CDT      ----- Message -----  From: Chirag Rigo Kingston  Sent: 4/27/2021 1:08 PM CDT  To: Lela Paul Ob/Gyne Clinical Staff  Subject: Test Results Question     Hi doctor Efra Costa. I hope that your day is going well.  I jus

## 2021-05-04 ENCOUNTER — TELEPHONE (OUTPATIENT)
Dept: OBGYN UNIT | Facility: HOSPITAL | Age: 36
End: 2021-05-04

## 2021-05-11 ENCOUNTER — TELEPHONE (OUTPATIENT)
Dept: OBGYN CLINIC | Facility: CLINIC | Age: 36
End: 2021-05-11

## 2021-05-11 NOTE — TELEPHONE ENCOUNTER
Pt is pregnant and would like to speak to DR Manoj Calero due to miscarriage 1 month ago and still birth last year , . He told her to call him when she got pregnant so they can come up with game plan

## 2021-05-12 ENCOUNTER — TELEPHONE (OUTPATIENT)
Dept: OBGYN CLINIC | Facility: CLINIC | Age: 36
End: 2021-05-12

## 2021-05-12 ENCOUNTER — LAB ENCOUNTER (OUTPATIENT)
Dept: LAB | Facility: REFERENCE LAB | Age: 36
End: 2021-05-12
Attending: OBSTETRICS & GYNECOLOGY
Payer: COMMERCIAL

## 2021-05-12 DIAGNOSIS — R39.9 UTI SYMPTOMS: ICD-10-CM

## 2021-05-12 PROCEDURE — 87086 URINE CULTURE/COLONY COUNT: CPT

## 2021-05-12 NOTE — TELEPHONE ENCOUNTER
Please contact patient and schedule her for an US at [facility] on Friday 6/4/2021.

## 2021-05-12 NOTE — TELEPHONE ENCOUNTER
I spoke patient on the phone and confirmed that she has a positive ucg. She has a hx of recurrent pregnancy loss both first trimester and second trimester. Her results have been normal with the exception of a positive IVETH IgM antibody.   Pt is to come for

## 2021-05-14 NOTE — TELEPHONE ENCOUNTER
Pt scheduled through [address] for [address] on 6/4

## 2021-05-27 ENCOUNTER — HOSPITAL ENCOUNTER (EMERGENCY)
Facility: HOSPITAL | Age: 36
Discharge: HOME | End: 2021-05-27
Attending: EMERGENCY MEDICINE
Payer: COMMERCIAL

## 2021-05-27 VITALS
BODY MASS INDEX: 25 KG/M2 | HEART RATE: 111 BPM | RESPIRATION RATE: 18 BRPM | OXYGEN SATURATION: 99 % | DIASTOLIC BLOOD PRESSURE: 74 MMHG | TEMPERATURE: 98 F | WEIGHT: 130 LBS | SYSTOLIC BLOOD PRESSURE: 136 MMHG

## 2021-05-27 DIAGNOSIS — O21.9 NAUSEA AND VOMITING IN PREGNANCY: Primary | ICD-10-CM

## 2021-05-27 PROCEDURE — 99284 EMERGENCY DEPT VISIT MOD MDM: CPT

## 2021-05-27 PROCEDURE — 81025 URINE PREGNANCY TEST: CPT

## 2021-05-27 RX ORDER — ONDANSETRON 4 MG/1
4 TABLET, ORALLY DISINTEGRATING ORAL ONCE
Status: COMPLETED | OUTPATIENT
Start: 2021-05-27 | End: 2021-05-27

## 2021-05-27 RX ORDER — DOXYLAMINE SUCCINATE AND PYRIDOXINE HYDROCHLORIDE, DELAYED RELEASE TABLETS 10 MG/10 MG 10; 10 MG/1; MG/1
2 TABLET, DELAYED RELEASE ORAL NIGHTLY
Qty: 120 TABLET | Refills: 0 | Status: SHIPPED | OUTPATIENT
Start: 2021-05-27 | End: 2022-01-18

## 2021-05-27 RX ORDER — ONDANSETRON 4 MG/1
4 TABLET, ORALLY DISINTEGRATING ORAL EVERY 4 HOURS PRN
Qty: 15 TABLET | Refills: 0 | Status: SHIPPED | OUTPATIENT
Start: 2021-05-27 | End: 2021-11-09

## 2021-05-27 NOTE — ED INITIAL ASSESSMENT (HPI)
Miscarriage on march 28th. Positive pregnancy test few weeks ago. Presents with nausea/vomiting. Notes some cramping.   Denies vaginal bleeding    Patient notes she was recently treated for chlamydia, treated on 5/26

## 2021-05-28 NOTE — ED PROVIDER NOTES
Patient Seen in: [facility] Emergency Department    History   Patient presents with:  Nausea/Vomiting/Diarrhea      HPI    The patient presents to the ED complaining of intermittent nausea and vomiting over the past several days.   Also has noted o problem noted.     Physical Exam     ED Triage Vitals [05/27/21 1851]   /74   Pulse 111   Resp 18   Temp 97.6 °F (36.4 °C)   Temp src Temporal   SpO2 99 %   O2 Device None (Room air)       All measures to prevent infection transmission during my inter Administered:   Medications   ondansetron (ZOFRAN-ODT) disintegrating tab 4 mg (4 mg Oral Given 5/27/21 1941)         MDM      05/27/21 1851   BP: 136/74   Pulse: 111   Resp: 18   Temp: 97.6 °F (36.4 °C)   TempSrc: Temporal   SpO2: 99%   Weight: 59 kg tablets by mouth nightly., Normal, Disp-120 tablet, R-0    ondansetron 4 MG Oral Tablet Dispersible  Take 1 tablet (4 mg total) by mouth every 4 (four) hours as needed for Nausea., Normal, Disp-15 tablet, R-0

## 2021-06-04 ENCOUNTER — OFFICE VISIT (OUTPATIENT)
Dept: OBGYN CLINIC | Facility: CLINIC | Age: 36
End: 2021-06-04
Payer: COMMERCIAL

## 2021-06-04 VITALS — SYSTOLIC BLOOD PRESSURE: 122 MMHG | DIASTOLIC BLOOD PRESSURE: 60 MMHG

## 2021-06-04 DIAGNOSIS — O26.20 CONFIRM FETAL VIABILITY, HISTORY OF RECURRENT MISCARRIAGE, ULTRASOUND: Primary | ICD-10-CM

## 2021-06-04 DIAGNOSIS — Z36.87 UNSURE OF LMP (LAST MENSTRUAL PERIOD) AS REASON FOR ULTRASOUND SCAN: ICD-10-CM

## 2021-06-04 DIAGNOSIS — Z32.01 PREGNANCY EXAMINATION OR TEST, POSITIVE RESULT: ICD-10-CM

## 2021-06-04 PROCEDURE — 3074F SYST BP LT 130 MM HG: CPT | Performed by: OBSTETRICS & GYNECOLOGY

## 2021-06-04 PROCEDURE — 76817 TRANSVAGINAL US OBSTETRIC: CPT | Performed by: OBSTETRICS & GYNECOLOGY

## 2021-06-04 PROCEDURE — 99213 OFFICE O/P EST LOW 20 MIN: CPT | Performed by: OBSTETRICS & GYNECOLOGY

## 2021-06-04 PROCEDURE — 3078F DIAST BP <80 MM HG: CPT | Performed by: OBSTETRICS & GYNECOLOGY

## 2021-06-04 NOTE — PROGRESS NOTES
[facility], [facility]  Obstetrics and Gynecology  Ultrasound Visit  Kami Mccracken MD    VIKTORIYA Rain is a 28year old [id number] with no menses after last miscarriage who presents for ultrasound to confirm viability and dating.      Patient had a posi Cancer Neg    • Colon Cancer Neg      Social History   Social History    Socioeconomic History      Marital status: Single      Spouse name: Not on file      Number of children: Not on file      Years of education: Not on file      Highest education level: Viable 7-0/7-week IUP with normal cervical length. Assessment   Teresa London is a 28year old female [id number] with viable IUP at 7-0/7 weeks. Will use ultrasound to establish ALICIA.   History of multiple miscarriages/pregnancy loss.    (O26.20) Confirm fetal viab

## 2021-06-14 ENCOUNTER — NURSE ONLY (OUTPATIENT)
Dept: OBGYN CLINIC | Facility: CLINIC | Age: 36
End: 2021-06-14
Payer: COMMERCIAL

## 2021-06-14 DIAGNOSIS — L81.8 DECORATIVE TATTOO: ICD-10-CM

## 2021-06-14 DIAGNOSIS — Z34.81 ENCOUNTER FOR SUPERVISION OF OTHER NORMAL PREGNANCY IN FIRST TRIMESTER: Primary | ICD-10-CM

## 2021-06-14 RX ORDER — CHOLECALCIFEROL (VITAMIN D3) 25 MCG
1 TABLET,CHEWABLE ORAL DAILY
COMMUNITY

## 2021-06-14 NOTE — PROGRESS NOTES
Pt here today for RN [facility] Education.     Missed menses apt with: INDIRA    Pre  BMI: 24.58  EPDS score:10/30  +UPT at home:  2021  +UPT in office: 2021 in ED    US: 2021  Working ALICIA: 2022 by Ultrasound  Hx of genetic abnormality in fam

## 2021-06-18 ENCOUNTER — OFFICE VISIT (OUTPATIENT)
Dept: OBGYN CLINIC | Facility: CLINIC | Age: 36
End: 2021-06-18
Payer: COMMERCIAL

## 2021-06-18 VITALS
HEART RATE: 97 BPM | SYSTOLIC BLOOD PRESSURE: 110 MMHG | WEIGHT: 140 LBS | BODY MASS INDEX: 26 KG/M2 | DIASTOLIC BLOOD PRESSURE: 81 MMHG

## 2021-06-18 DIAGNOSIS — O36.80X0 ULTRASOUND SCAN TO CONFIRM FETAL VIABILITY WITH HISTORY OF MISCARRIAGE: Primary | ICD-10-CM

## 2021-06-18 DIAGNOSIS — Z87.59 ULTRASOUND SCAN TO CONFIRM FETAL VIABILITY WITH HISTORY OF MISCARRIAGE: Primary | ICD-10-CM

## 2021-06-18 PROCEDURE — 3079F DIAST BP 80-89 MM HG: CPT | Performed by: OBSTETRICS & GYNECOLOGY

## 2021-06-18 PROCEDURE — 3074F SYST BP LT 130 MM HG: CPT | Performed by: OBSTETRICS & GYNECOLOGY

## 2021-06-18 NOTE — PROGRESS NOTES
[facility], [facility]  Obstetrics and Gynecology  Ultrasound Visit  Huyen Perez MD    VIKTORIYA Floyd is a 39year old [id number] with Patient's last menstrual period was 01/21/2021 (exact date).  who presents for ultrasound to confirm viability and da CA   • Cancer Maternal Grandmother    • No Known Problems Maternal Grandfather    • No Known Problems Paternal Grandmother    • No Known Problems Paternal Grandfather    • Ovarian Cancer Neg    • Colon Cancer Neg      Social History   Social History    Soc masses  GYNE/: Deferred. EXTREMITIES:  non tender without edema  Ultrasound   In office abdominal ultrasound confirms a viable 9+ week IUP consistent with previous dating.   Assessment   Michela Drummond is a 39year old female [id number] with viable IUP at 9+ week

## 2021-06-28 ENCOUNTER — PATIENT MESSAGE (OUTPATIENT)
Dept: OBGYN CLINIC | Facility: CLINIC | Age: 36
End: 2021-06-28

## 2021-06-29 NOTE — TELEPHONE ENCOUNTER
Please provide patient with a note that states that due to pregnancy related symptoms she would benefit from continuing to work at home through 8/1/2021. Please provide patient with a note.

## 2021-07-06 ENCOUNTER — PATIENT MESSAGE (OUTPATIENT)
Dept: OBGYN CLINIC | Facility: CLINIC | Age: 36
End: 2021-07-06

## 2021-07-06 RX ORDER — ONDANSETRON 4 MG/1
4 TABLET, FILM COATED ORAL EVERY 8 HOURS PRN
Qty: 9 TABLET | Refills: 0 | Status: SHIPPED | OUTPATIENT
Start: 2021-07-06 | End: 2021-11-09

## 2021-07-06 NOTE — TELEPHONE ENCOUNTER
Patient name and  verified. Patient informed Zofran 4mg ordered for patient along with MLM recommendations. Patient verbalized understanding.

## 2021-07-06 NOTE — TELEPHONE ENCOUNTER
May send in Zofran 4 mg 1 tab po q 8 hours prn Nausea/vomiting. Send in 9 tablets. Patient should try and use the medication only when she really feels bad.   There may be some associated risks to the fetus but it sounds like she has tried everything else

## 2021-07-12 ENCOUNTER — LAB ENCOUNTER (OUTPATIENT)
Dept: LAB | Facility: HOSPITAL | Age: 36
End: 2021-07-12
Attending: OBSTETRICS & GYNECOLOGY
Payer: COMMERCIAL

## 2021-07-12 ENCOUNTER — ROUTINE PRENATAL (OUTPATIENT)
Dept: OBGYN CLINIC | Facility: CLINIC | Age: 36
End: 2021-07-12
Payer: COMMERCIAL

## 2021-07-12 VITALS — WEIGHT: 141 LBS | BODY MASS INDEX: 27 KG/M2

## 2021-07-12 DIAGNOSIS — O09.521 MULTIGRAVIDA OF ADVANCED MATERNAL AGE IN FIRST TRIMESTER: ICD-10-CM

## 2021-07-12 DIAGNOSIS — Z34.81 ENCOUNTER FOR SUPERVISION OF OTHER NORMAL PREGNANCY IN FIRST TRIMESTER: ICD-10-CM

## 2021-07-12 DIAGNOSIS — Z34.81 ENCOUNTER FOR SUPERVISION OF OTHER NORMAL PREGNANCY IN FIRST TRIMESTER: Primary | ICD-10-CM

## 2021-07-12 DIAGNOSIS — L81.8 DECORATIVE TATTOO: ICD-10-CM

## 2021-07-12 LAB
ANTIBODY SCREEN: NEGATIVE
APPEARANCE: CLEAR
BASOPHILS # BLD AUTO: 0.03 X10(3) UL (ref 0–0.2)
BASOPHILS NFR BLD AUTO: 0.4 %
BILIRUBIN: NEGATIVE
DEPRECATED RDW RBC AUTO: 43.7 FL (ref 35.1–46.3)
EOSINOPHIL # BLD AUTO: 0.08 X10(3) UL (ref 0–0.7)
EOSINOPHIL NFR BLD AUTO: 1 %
ERYTHROCYTE [DISTWIDTH] IN BLOOD BY AUTOMATED COUNT: 12.6 % (ref 11–15)
GLUCOSE (URINE DIPSTICK): NEGATIVE MG/DL
HBV SURFACE AG SER-ACNC: <0.1 [IU]/L
HBV SURFACE AG SERPL QL IA: NONREACTIVE
HCT VFR BLD AUTO: 35 %
HCV AB SERPL QL IA: NONREACTIVE
HGB BLD-MCNC: 12 G/DL
IMM GRANULOCYTES # BLD AUTO: 0.05 X10(3) UL (ref 0–1)
IMM GRANULOCYTES NFR BLD: 0.6 %
KETONES (URINE DIPSTICK): 40 MG/DL
LEUKOCYTES: NEGATIVE
LYMPHOCYTES # BLD AUTO: 1.49 X10(3) UL (ref 1–4)
LYMPHOCYTES NFR BLD AUTO: 18.5 %
MCH RBC QN AUTO: 33 PG (ref 26–34)
MCHC RBC AUTO-ENTMCNC: 34.3 G/DL (ref 31–37)
MCV RBC AUTO: 96.2 FL
MONOCYTES # BLD AUTO: 0.64 X10(3) UL (ref 0.1–1)
MONOCYTES NFR BLD AUTO: 7.9 %
MULTISTIX LOT#: 981 NUMERIC
NEUTROPHILS # BLD AUTO: 5.78 X10 (3) UL (ref 1.5–7.7)
NEUTROPHILS # BLD AUTO: 5.78 X10(3) UL (ref 1.5–7.7)
NEUTROPHILS NFR BLD AUTO: 71.6 %
NITRITE, URINE: NEGATIVE
OCCULT BLOOD: NEGATIVE
PH, URINE: 7 (ref 4.5–8)
PLATELET # BLD AUTO: 369 10(3)UL (ref 150–450)
PROTEIN (URINE DIPSTICK): NEGATIVE MG/DL
RBC # BLD AUTO: 3.64 X10(6)UL
RH BLOOD TYPE: POSITIVE
RUBV IGG SER QL: POSITIVE
RUBV IGG SER-ACNC: 82.9 IU/ML (ref 10–?)
SPECIFIC GRAVITY: 1.02 (ref 1–1.03)
URINE-COLOR: YELLOW
UROBILINOGEN,SEMI-QN: 0.2 MG/DL (ref 0–1.9)
WBC # BLD AUTO: 8.1 X10(3) UL (ref 4–11)

## 2021-07-12 PROCEDURE — 87389 HIV-1 AG W/HIV-1&-2 AB AG IA: CPT

## 2021-07-12 PROCEDURE — 86901 BLOOD TYPING SEROLOGIC RH(D): CPT

## 2021-07-12 PROCEDURE — 85025 COMPLETE CBC W/AUTO DIFF WBC: CPT

## 2021-07-12 PROCEDURE — 86900 BLOOD TYPING SEROLOGIC ABO: CPT

## 2021-07-12 PROCEDURE — 86777 TOXOPLASMA ANTIBODY: CPT

## 2021-07-12 PROCEDURE — 86850 RBC ANTIBODY SCREEN: CPT

## 2021-07-12 PROCEDURE — 36415 COLL VENOUS BLD VENIPUNCTURE: CPT

## 2021-07-12 PROCEDURE — 87086 URINE CULTURE/COLONY COUNT: CPT

## 2021-07-12 PROCEDURE — 86780 TREPONEMA PALLIDUM: CPT

## 2021-07-12 PROCEDURE — 81002 URINALYSIS NONAUTO W/O SCOPE: CPT | Performed by: OBSTETRICS & GYNECOLOGY

## 2021-07-12 PROCEDURE — 87340 HEPATITIS B SURFACE AG IA: CPT

## 2021-07-12 PROCEDURE — 86803 HEPATITIS C AB TEST: CPT

## 2021-07-12 PROCEDURE — 86778 TOXOPLASMA ANTIBODY IGM: CPT

## 2021-07-12 PROCEDURE — 86762 RUBELLA ANTIBODY: CPT

## 2021-07-12 NOTE — PROGRESS NOTES
[facility], [facility]  Obstetrics and Gynecology  Prenatal Visit  Quincy Pinedo MD    VIKTORIYA Wilcox is a 39year old.o. [id number]  female with Patient's last menstrual period was 01/21/2021 (exact date).   and with an estimated date of delivery of: 1/21 testing        Genetic testing        Genetic testing        GC DNA        Chlamydia DNA              24-28 Weeks     Test Value Reference Range Date Time    HCT        HGB        Platelets        GTT 1 Hr        Glucose Fasting        Glucose 1 Hr • OTHER  2017    Gluteal Augmentation 2017 and Revision in 2018     Allergies     Latex                   RASH  Shrimp                  ANAPHYLAXIS  Medications     Current Outpatient Medications   Medication Sig Dispense Refill   • Ondansetron HCl (ZOFR [employer] Services:       Active Member of Clubs or Organizations:       Attends Club or Organization Meetings:       Marital Status:   Intimate Partner Violence:       Fear of Current or Ex-Partner:       Emotionally Abused:       Physically Abused:       Olga Atkins MD, MD  2:33 PM  7/12/2021

## 2021-07-13 LAB
C TRACH DNA SPEC QL NAA+PROBE: NEGATIVE
N GONORRHOEA DNA SPEC QL NAA+PROBE: NEGATIVE

## 2021-07-14 LAB
T PALLIDUM AB SER QL: NEGATIVE
TOXOPLASMA GONDII AB, IGG: <3 IU/ML
TOXOPLASMA GONDII AB, IGM: <3 AU/ML

## 2021-07-26 ENCOUNTER — TELEPHONE (OUTPATIENT)
Dept: OBGYN CLINIC | Facility: CLINIC | Age: 36
End: 2021-07-26

## 2021-07-26 NOTE — TELEPHONE ENCOUNTER
Innatal test received    Sample Collected date: 7/20/2021  Sample Reported date: 7/25/2021      Results: Low Risk   Chromosome 21,18, 13: no aneuploidy   Fetal Sex: Not Reported       Placed in [address] for review.  Routing to on call provider to review as

## 2021-07-26 NOTE — TELEPHONE ENCOUNTER
Pt Name and  verified. Patient informed and verbalized understanding. Pt would like to know fetal sex. This RN will call Masood Haider and inquire about fetal sex.

## 2021-07-26 NOTE — TELEPHONE ENCOUNTER
Case ID [id number]    Spoke with Nirmala de los santos and she states that she will fax over a change authorization form and to flora off \"report fetal sex\" on form and fax back. States it will take up to 48 hours to receive information. Will await fax.

## 2021-07-27 ENCOUNTER — PATIENT MESSAGE (OUTPATIENT)
Dept: OBGYN CLINIC | Facility: CLINIC | Age: 36
End: 2021-07-27

## 2021-07-27 NOTE — TELEPHONE ENCOUNTER
We do not give lengthy time off of work like she is requesting 5 weeks for nausea and vomiting in pregnancy. These conditions most often improve, are manageed, or resolve. She can have a few days to a week at a time.     Can be forwarded to Dr. Johny Corbin

## 2021-07-27 NOTE — TELEPHONE ENCOUNTER
Sammy Colbert, RN 7/27/2021 8:08 AM CDT    Routing to on call provider. JJF not in office.  Pls advise   ----- Message -----  From: Dwayne Kingston  Sent: 7/27/2021 7:47 AM CDT  To: Lela Wmob Ob/Gyne Clinical Staff  Subject: Non-Urgent Medical Question

## 2021-07-27 NOTE — TELEPHONE ENCOUNTER
OB History     T0    L0    SAB3  TAB1  Ectopic0  Multiple0  Live Births0   14w4d    Patient name and  verified. Patient given note to work from home until 2021 by Kadeem Burgos.  Per patient still complaining of n/v and feels she would benefit from

## 2021-07-28 NOTE — TELEPHONE ENCOUNTER
Change authorization form received and completed. Form faxed to [facility]. 24-48 hour turn around time.

## 2021-08-02 ENCOUNTER — TELEPHONE (OUTPATIENT)
Dept: OBGYN CLINIC | Facility: CLINIC | Age: 36
End: 2021-08-02

## 2021-08-02 NOTE — TELEPHONE ENCOUNTER
Pt states that she was supposed to be called regarding baby's gender but has not received a call.    She wants to know the sex of the baby  Please advise

## 2021-08-02 NOTE — TELEPHONE ENCOUNTER
Per JF, ok to extend leave per pt request due to nausea and vomiting. Note generated and sent to my chart. Pt informed.

## 2021-08-02 NOTE — TELEPHONE ENCOUNTER
Pt is requesting a note for work stating that she is to work from home till 09/01/2021  Pt states that the vomiting and nausea due to pregnancy hasn't improved and doesn't feel ready to go back to work.  She is afraid that she will be sent home anyways from

## 2021-08-09 ENCOUNTER — PATIENT MESSAGE (OUTPATIENT)
Dept: OBGYN CLINIC | Facility: CLINIC | Age: 36
End: 2021-08-09

## 2021-08-09 ENCOUNTER — ROUTINE PRENATAL (OUTPATIENT)
Dept: OBGYN CLINIC | Facility: CLINIC | Age: 36
End: 2021-08-09
Payer: COMMERCIAL

## 2021-08-09 VITALS — SYSTOLIC BLOOD PRESSURE: 111 MMHG | WEIGHT: 142 LBS | DIASTOLIC BLOOD PRESSURE: 74 MMHG | BODY MASS INDEX: 27 KG/M2

## 2021-08-09 DIAGNOSIS — Z11.3 ROUTINE SCREENING FOR STI (SEXUALLY TRANSMITTED INFECTION): ICD-10-CM

## 2021-08-09 DIAGNOSIS — Z34.92 NORMAL PREGNANCY IN SECOND TRIMESTER: Primary | ICD-10-CM

## 2021-08-09 LAB
APPEARANCE: CLEAR
BILIRUBIN: NEGATIVE
GLUCOSE (URINE DIPSTICK): NEGATIVE MG/DL
LEUKOCYTES: NEGATIVE
MULTISTIX LOT#: NORMAL NUMERIC
NITRITE, URINE: NEGATIVE
OCCULT BLOOD: NEGATIVE
PH, URINE: 7 (ref 4.5–8)
PROTEIN (URINE DIPSTICK): NEGATIVE MG/DL
SPECIFIC GRAVITY: 1.02 (ref 1–1.03)
URINE-COLOR: YELLOW
UROBILINOGEN,SEMI-QN: 0.2 MG/DL (ref 0–1.9)

## 2021-08-09 PROCEDURE — 3078F DIAST BP <80 MM HG: CPT | Performed by: OBSTETRICS & GYNECOLOGY

## 2021-08-09 PROCEDURE — 81002 URINALYSIS NONAUTO W/O SCOPE: CPT | Performed by: OBSTETRICS & GYNECOLOGY

## 2021-08-09 PROCEDURE — 3074F SYST BP LT 130 MM HG: CPT | Performed by: OBSTETRICS & GYNECOLOGY

## 2021-08-09 NOTE — PROGRESS NOTES
Problem visit:  Patient reports cramping but really concerned about fetal movement. H/O 20 week demise. Has felt brief flutters but then nothing. Patient reassured. Also + Ketones in urine.   Patient encouraged to drink more and take small frequent meal

## 2021-08-09 NOTE — TELEPHONE ENCOUNTER
OB History     T0    L0    SAB3  TAB1  Ectopic0  Multiple0  Live Births0   16w3d    Patient complaining of dull cramping in low abdomen, patient with a history of fetal demise at 21 weeks. Denies bleeding.  Appointment scheduled for today with DIPTI

## 2021-08-10 LAB
C TRACH DNA SPEC QL NAA+PROBE: NEGATIVE
N GONORRHOEA DNA SPEC QL NAA+PROBE: NEGATIVE

## 2021-08-18 ENCOUNTER — ROUTINE PRENATAL (OUTPATIENT)
Dept: OBGYN CLINIC | Facility: CLINIC | Age: 36
End: 2021-08-18
Payer: COMMERCIAL

## 2021-08-18 VITALS
SYSTOLIC BLOOD PRESSURE: 117 MMHG | DIASTOLIC BLOOD PRESSURE: 77 MMHG | WEIGHT: 148.19 LBS | HEART RATE: 116 BPM | BODY MASS INDEX: 28 KG/M2

## 2021-08-18 DIAGNOSIS — O09.529 ANTEPARTUM MULTIGRAVIDA OF ADVANCED MATERNAL AGE: Primary | ICD-10-CM

## 2021-08-18 DIAGNOSIS — O09.522 MULTIGRAVIDA OF ADVANCED MATERNAL AGE IN SECOND TRIMESTER: ICD-10-CM

## 2021-08-18 DIAGNOSIS — O09.299 PRIOR PREGNANCY WITH FETAL DEMISE: ICD-10-CM

## 2021-08-18 LAB
APPEARANCE: CLEAR
BILIRUBIN: NEGATIVE
GLUCOSE (URINE DIPSTICK): NEGATIVE MG/DL
KETONES (URINE DIPSTICK): NEGATIVE MG/DL
MULTISTIX LOT#: 5077 NUMERIC
NITRITE, URINE: NEGATIVE
OCCULT BLOOD: NEGATIVE
PH, URINE: 6.5 (ref 4.5–8)
SPECIFIC GRAVITY: 1.02 (ref 1–1.03)
URINE-COLOR: YELLOW
UROBILINOGEN,SEMI-QN: 0.2 MG/DL (ref 0–1.9)

## 2021-08-18 PROCEDURE — 3078F DIAST BP <80 MM HG: CPT | Performed by: OBSTETRICS & GYNECOLOGY

## 2021-08-18 PROCEDURE — 81002 URINALYSIS NONAUTO W/O SCOPE: CPT | Performed by: OBSTETRICS & GYNECOLOGY

## 2021-08-18 PROCEDURE — 3074F SYST BP LT 130 MM HG: CPT | Performed by: OBSTETRICS & GYNECOLOGY

## 2021-08-18 NOTE — PROGRESS NOTES
[facility], [facility]  Obstetrics and Gynecology  Prenatal Visit  MD VIKTORIYA Holloway   Yocasta Crawford is a 39year old.o. [id number] 17w5d weeks. Patient feeling intermittent fetal movement.   She states her nausea has improved but she is still having exc second trimester  Plan: MATERNAL FETAL MEDICINE - INTERNAL, URINALYSIS         NONAUTO W/O SCOPE    (O09.299) Prior pregnancy with fetal demise    Plan   Patient has [facility] ultrasound order placed and to schedule appointment for 2 to 3 weeks.   Pt counseled on

## 2021-08-30 ENCOUNTER — TELEPHONE (OUTPATIENT)
Dept: PERINATAL CARE | Facility: HOSPITAL | Age: 36
End: 2021-08-30

## 2021-09-01 ENCOUNTER — PATIENT MESSAGE (OUTPATIENT)
Dept: OBGYN CLINIC | Facility: CLINIC | Age: 36
End: 2021-09-01

## 2021-09-03 NOTE — TELEPHONE ENCOUNTER
I spoke with patient on the phone. She states her symptoms have improved today mostly resolved. She is having normal fetal movement. She denies bleeding, leaking of fluid or significant cramping/contractions.   I offered her an appointment tomorrow, 9/3/

## 2021-09-03 NOTE — TELEPHONE ENCOUNTER
From: Jean Carlos Early  To: Zay Andrews MD, MD  Sent: 9/1/2021 3:00 PM CDT  Subject: Non-Urgent Medical Question    Hi Dr Justine Cordova. I hope that you're doing well. I'm experiencing muscle soreness around my stomach and back. This is the 3rd day now.  It

## 2021-09-10 ENCOUNTER — HOSPITAL ENCOUNTER (OUTPATIENT)
Dept: PERINATAL CARE | Facility: HOSPITAL | Age: 36
Discharge: HOME | End: 2021-09-10
Attending: OBSTETRICS & GYNECOLOGY
Payer: COMMERCIAL

## 2021-09-10 VITALS
WEIGHT: 150 LBS | BODY MASS INDEX: 28 KG/M2 | SYSTOLIC BLOOD PRESSURE: 114 MMHG | DIASTOLIC BLOOD PRESSURE: 75 MMHG | HEART RATE: 113 BPM

## 2021-09-10 DIAGNOSIS — O09.522 AMA (ADVANCED MATERNAL AGE) MULTIGRAVIDA 35+, SECOND TRIMESTER: ICD-10-CM

## 2021-09-10 DIAGNOSIS — Z87.59 H/O FETAL DEMISE, NOT CURRENTLY PREGNANT: ICD-10-CM

## 2021-09-10 DIAGNOSIS — O09.299 PRIOR PREGNANCY WITH FETAL DEMISE: ICD-10-CM

## 2021-09-10 DIAGNOSIS — Z36.3 SCREENING, ANTENATAL, FOR MALFORMATION BY ULTRASOUND: ICD-10-CM

## 2021-09-10 DIAGNOSIS — O09.522 AMA (ADVANCED MATERNAL AGE) MULTIGRAVIDA 35+, SECOND TRIMESTER: Primary | ICD-10-CM

## 2021-09-10 DIAGNOSIS — E07.9 THYROID DYSFUNCTION: ICD-10-CM

## 2021-09-10 PROCEDURE — 76811 OB US DETAILED SNGL FETUS: CPT | Performed by: OBSTETRICS & GYNECOLOGY

## 2021-09-10 PROCEDURE — 99215 OFFICE O/P EST HI 40 MIN: CPT | Performed by: OBSTETRICS & GYNECOLOGY

## 2021-09-10 NOTE — PROGRESS NOTES
Reason for Consult:   Dear Dr. Ezra Olvera,    Thank you for requesting ultrasound evaluation and maternal fetal medicine consultation on Catrachita Galicia. As you are aware she is a 39year old female with a Street pregnancy at 20w0d.   A maternal-fetal tablet Take 1 tablet (4 mg total) by mouth every 8 (eight) hours as needed. (Patient not taking: Reported on 8/18/2021 ) 9 tablet 0   • prenatal multivitamin plus DHA 27-0.8-228 MG Oral Cap Take 1 capsule by mouth daily.      • doxylamine-pyridoxine 10-10 M higher rate of:  · Fetal malformations  · Preeclampsia  · Gestational diabetes  · Intrauterine fetal death    As a result, enhanced pregnancy surveillance is advised for these patients including a comprehensive ultrasound to assess for fetal malformations testing starting at 36 weeks of gestation would drop the risk of fetal death from 5.2 to 1.3 per 1000 pregnancies.  While a policy of antepartum testing in older women does increase the chance that a women will be induced (71 inductions per fetal death aver lowest false positive rate) for the detection of fetal aneuploidy amongst high-risk patients. The limitations of detailed mid-trimester sonography was reviewed with the patient.  First trimester screening and second trimester multiple-marker serum serum scr 2% of stillbirths at term. · Placental abnormalities and cord accidents compose another group of causes. Although many stillbirths are attributed to a cord accident, this diagnosis should be made with caution.  Cord abnormalities, including a nuchal cord, these patients, anticoagulation can be considered on a case-by-case basis after taking into account the patient's medical and obstetric history, placental histopathology, and other potential causes of the adverse pregnancy outcome.  For example, treatment m cerebral ventricles, choroid plexus, Cisterna Magna, midline falx, cerebellum, cerebellar lobes, posterior fossa, vermis, cavum septi pellucidi.   Face: eyes normal, profile normal, nose normal, lip normal, palate normal.  Heart: visualized and normal appea

## 2021-09-21 ENCOUNTER — ROUTINE PRENATAL (OUTPATIENT)
Dept: OBGYN CLINIC | Facility: CLINIC | Age: 36
End: 2021-09-21
Payer: COMMERCIAL

## 2021-09-21 VITALS — DIASTOLIC BLOOD PRESSURE: 73 MMHG | SYSTOLIC BLOOD PRESSURE: 120 MMHG | WEIGHT: 150.19 LBS | BODY MASS INDEX: 28 KG/M2

## 2021-09-21 DIAGNOSIS — Z34.82 ENCOUNTER FOR SUPERVISION OF OTHER NORMAL PREGNANCY IN SECOND TRIMESTER: Primary | ICD-10-CM

## 2021-09-21 LAB
BILIRUBIN: NEGATIVE
GLUCOSE (URINE DIPSTICK): NEGATIVE MG/DL
KETONES (URINE DIPSTICK): NEGATIVE MG/DL
LEUKOCYTES: NEGATIVE
MULTISTIX LOT#: 5077 NUMERIC
NITRITE, URINE: NEGATIVE
OCCULT BLOOD: NEGATIVE
PH, URINE: 6 (ref 4.5–8)
PROTEIN (URINE DIPSTICK): NEGATIVE MG/DL
SPECIFIC GRAVITY: 1.01 (ref 1–1.03)
URINE-COLOR: YELLOW
UROBILINOGEN,SEMI-QN: 0.2 MG/DL (ref 0–1.9)

## 2021-09-21 PROCEDURE — 3078F DIAST BP <80 MM HG: CPT | Performed by: OBSTETRICS & GYNECOLOGY

## 2021-09-21 PROCEDURE — 81002 URINALYSIS NONAUTO W/O SCOPE: CPT | Performed by: OBSTETRICS & GYNECOLOGY

## 2021-09-21 PROCEDURE — 3074F SYST BP LT 130 MM HG: CPT | Performed by: OBSTETRICS & GYNECOLOGY

## 2021-09-21 RX ORDER — LEVOTHYROXINE SODIUM 112 UG/1
112 TABLET ORAL DAILY
COMMUNITY
Start: 2021-09-08 | End: 2021-10-19

## 2021-09-21 NOTE — PROGRESS NOTES
[facility], [facility]  Obstetrics and Gynecology  Prenatal Visit  Neyda Short MD    VIKTORIYA Ibrahim is a 39year old.o. [id number] 22w4d weeks. Patient feeling normal fetal movement. She denies any cramping contractions or bleeding.   She states that w and long. Assessment   Victoria Jones is a 39year old female [id number] with viable IUP at 22w4d weeks. Advanced maternal age pregnancy with normal cell free DNA. Patient with history of 19-week loss and previous pregnancy.   Yeast vulvovaginitis.  (Z34.82) Encou

## 2021-09-23 ENCOUNTER — TELEPHONE (OUTPATIENT)
Dept: OBGYN UNIT | Facility: HOSPITAL | Age: 36
End: 2021-09-23

## 2021-09-23 NOTE — PROGRESS NOTES
Spoke with patient for Share follow up for 1 year anniversary of her loss. Pt is doing ok, still misses Sincere Smoke and thinks about her. Pt is excited about her current pregnancy and sometimes feels guilty. She is unsure of what she will do for anniversary.

## 2021-09-29 ENCOUNTER — HOSPITAL ENCOUNTER (EMERGENCY)
Facility: HOSPITAL | Age: 36
Discharge: HOME | End: 2021-09-30
Attending: EMERGENCY MEDICINE
Payer: COMMERCIAL

## 2021-09-29 DIAGNOSIS — J02.9 VIRAL PHARYNGITIS: ICD-10-CM

## 2021-09-29 DIAGNOSIS — J06.9 VIRAL URI: Primary | ICD-10-CM

## 2021-09-29 PROCEDURE — 99283 EMERGENCY DEPT VISIT LOW MDM: CPT

## 2021-09-30 ENCOUNTER — APPOINTMENT (OUTPATIENT)
Dept: GENERAL RADIOLOGY | Facility: HOSPITAL | Age: 36
End: 2021-09-30
Attending: EMERGENCY MEDICINE
Payer: COMMERCIAL

## 2021-09-30 VITALS
HEART RATE: 102 BPM | WEIGHT: 150 LBS | RESPIRATION RATE: 18 BRPM | DIASTOLIC BLOOD PRESSURE: 83 MMHG | BODY MASS INDEX: 28.32 KG/M2 | SYSTOLIC BLOOD PRESSURE: 117 MMHG | TEMPERATURE: 99 F | OXYGEN SATURATION: 100 % | HEIGHT: 61 IN

## 2021-09-30 PROCEDURE — 87880 STREP A ASSAY W/OPTIC: CPT

## 2021-09-30 PROCEDURE — 71045 X-RAY EXAM CHEST 1 VIEW: CPT | Performed by: EMERGENCY MEDICINE

## 2021-09-30 NOTE — ED PROVIDER NOTES
Patient Seen in: [facility] AND [facility] Emergency Department      History   Patient presents with:  Cough/URI    Stated Complaint: Congestion     Subjective:   HPI  Patient is a 40-year-old pregnant female at 6 months estimated gestation presenting with conge Extraocular movements intact. Conjunctiva/sclera: Conjunctivae normal.      Pupils: Pupils are equal, round, and reactive to light. Cardiovascular:      Rate and Rhythm: Regular rhythm. Tachycardia present.    Pulmonary:      Effort: Pulmonary effort Disposition:  Discharge  9/30/2021  1:18 am    Follow-up:  Angel Lee  [address] [id number] [phone]    Schedule an appointment as soon as possible for a visit  If symptoms worsen          Medications Prescribed

## 2021-09-30 NOTE — ED INITIAL ASSESSMENT (HPI)
Congestion, cough, sore throat and overall fatigued x 2 days. Pt is 6 months pregnant. Pt denies COVID exposures.

## 2021-10-07 ENCOUNTER — PATIENT MESSAGE (OUTPATIENT)
Dept: OBGYN CLINIC | Facility: CLINIC | Age: 36
End: 2021-10-07

## 2021-10-08 ENCOUNTER — TELEPHONE (OUTPATIENT)
Dept: OBGYN CLINIC | Facility: CLINIC | Age: 36
End: 2021-10-08

## 2021-10-08 RX ORDER — AMOXICILLIN 500 MG/1
500 CAPSULE ORAL 3 TIMES DAILY
Qty: 21 CAPSULE | Refills: 0 | Status: SHIPPED | OUTPATIENT
Start: 2021-10-08 | End: 2021-10-15

## 2021-10-08 NOTE — TELEPHONE ENCOUNTER
Sapna Camejo RN 10/7/2021 5:03 PM CDT      ----- Message -----  From: Delgado Kingston  Sent: 10/7/2021 4:55 PM CDT  To: Lela Wmob Ob/Gyne Clinical Staff  Subject: Bronchitis     Hi Dr. Malissa Mejia.  I went to Dr Cyndi Radford at [place] yesterday and he said that it

## 2021-10-08 NOTE — TELEPHONE ENCOUNTER
Noted pt to have congested cough while talking on the phone. Pt denies any SOB or fever, but voices abdomen very sore from coughing. Pt has taken sudafed, Mucinex, and Tylenol sinus with no relief.  Pt has been seen in the ER and saw her pcp, but her pcp wo

## 2021-10-08 NOTE — TELEPHONE ENCOUNTER
----- Message from Sharon Kingston sent at 10/8/2021  3:32 PM CDT -----  Regarding: Bronchitis   Hi Dr. Page Guillen. I hope that your day is going well. I spoke with Dr. Aparna Moses office and he's not recommending anything and is pushing me back to you again.  I

## 2021-10-08 NOTE — TELEPHONE ENCOUNTER
Rep from Dr Hardy Prince office would like to discuss the current situation with the patient who was seen in their office this week for cough and covid symptoms. Please call to discuss.

## 2021-10-09 NOTE — TELEPHONE ENCOUNTER
I spoke with patient on the phone in the morning of 10/8/2021. I related for upper respitory infection she should contact her primary care provider for treatment options.   I discussed that I would evaluate treatment options if there was concern about amber

## 2021-10-09 NOTE — TELEPHONE ENCOUNTER
Pt stated sx of bronchitis , pt was seen by her pcp who advised she get abx from her ob, pt stated she is not allergic to abx and took pcn/amox before,  rx for amox sent to pharmacy, pt will also use robitussin dm, and will call if sx not improved or resol

## 2021-10-09 NOTE — TELEPHONE ENCOUNTER
I spoke with patient on the phone in the morning of 10/8/2021. I related for upper respitory infection she should contact her primary care provider for treatment options.   I discussed that I would evaluate treatment options if there was concern about maber

## 2021-10-14 ENCOUNTER — TELEPHONE (OUTPATIENT)
Dept: OBGYN CLINIC | Facility: CLINIC | Age: 36
End: 2021-10-14

## 2021-10-14 NOTE — TELEPHONE ENCOUNTER
David message sent to pt.      ----- Message from [address] Flowers sent at 10/14/2021 12:20 PM CDT -----  Regarding: Pain right under left upper rib  Hi doctor Dave Arreola. I'm so sorry to bother you so much. I've had a sharp pain in my right upper rib for a few days now. It's slightly under and to the right of my right breast. It's been really painful the past 2 days. I just assumed it was from the coughing at night but now I'm not sure because I'm not coughing much. WhenI lay down it hurts and when I do cough or breath deeply it hurts terribly. I was not sure where to even start with this so I just wanted to ask you.

## 2021-10-15 ENCOUNTER — HOSPITAL ENCOUNTER (OUTPATIENT)
Facility: HOSPITAL | Age: 36
Setting detail: OBSERVATION
Discharge: HOME | End: 2021-10-15
Attending: OBSTETRICS & GYNECOLOGY | Admitting: OBSTETRICS & GYNECOLOGY
Payer: COMMERCIAL

## 2021-10-15 VITALS — HEART RATE: 111 BPM | DIASTOLIC BLOOD PRESSURE: 70 MMHG | TEMPERATURE: 99 F | SYSTOLIC BLOOD PRESSURE: 116 MMHG

## 2021-10-15 PROBLEM — Z34.90 PREGNANCY (HCC): Status: ACTIVE | Noted: 2021-10-15

## 2021-10-15 PROBLEM — Z34.90 PREGNANCY: Status: ACTIVE | Noted: 2021-10-15

## 2021-10-15 PROCEDURE — 59025 FETAL NON-STRESS TEST: CPT | Performed by: OBSTETRICS & GYNECOLOGY

## 2021-10-15 RX ORDER — ACETAMINOPHEN 325 MG/1
650 TABLET ORAL EVERY 6 HOURS PRN
Status: DISCONTINUED | OUTPATIENT
Start: 2021-10-15 | End: 2021-10-15

## 2021-10-15 NOTE — TRIAGE
[facility]      Triage Note    Nathaniel Fees Kingston Patient Status:  Observation    1985 MRN [id number]   Location [address] [facility] Attending Cristina Santos MD   Hosp Day # 0 PCP Jerilyn Moon 10/15/21  0915   BP: 116/70   Pulse: 111   Temp: 99 °F (37.2 °C)   TempSrc: Oral       NST  Variability: Moderate           Accelerations: Yes           Decelerations: None            Baseline: 130 BPM           Uterine Irritability: No           Contracti

## 2021-10-15 NOTE — PROGRESS NOTES
Pt is a 39year old female admitted to TR1/TR1-A. Patient presents with:  R/o  Labor: right sided low abdominal pain that radiates to the back started yesterday around 2100.  Denies LOF or vaginal bleeding     Pt is [id number] 26w0d intra-uterine preg

## 2021-10-19 ENCOUNTER — ROUTINE PRENATAL (OUTPATIENT)
Dept: OBGYN CLINIC | Facility: CLINIC | Age: 36
End: 2021-10-19
Payer: COMMERCIAL

## 2021-10-19 VITALS — WEIGHT: 156 LBS | SYSTOLIC BLOOD PRESSURE: 121 MMHG | DIASTOLIC BLOOD PRESSURE: 85 MMHG | BODY MASS INDEX: 29 KG/M2

## 2021-10-19 DIAGNOSIS — Z34.82 ENCOUNTER FOR SUPERVISION OF OTHER NORMAL PREGNANCY IN SECOND TRIMESTER: Primary | ICD-10-CM

## 2021-10-19 PROCEDURE — 81002 URINALYSIS NONAUTO W/O SCOPE: CPT | Performed by: OBSTETRICS & GYNECOLOGY

## 2021-10-19 PROCEDURE — 90686 IIV4 VACC NO PRSV 0.5 ML IM: CPT | Performed by: OBSTETRICS & GYNECOLOGY

## 2021-10-19 PROCEDURE — 3074F SYST BP LT 130 MM HG: CPT | Performed by: OBSTETRICS & GYNECOLOGY

## 2021-10-19 PROCEDURE — 90471 IMMUNIZATION ADMIN: CPT | Performed by: OBSTETRICS & GYNECOLOGY

## 2021-10-19 PROCEDURE — 3079F DIAST BP 80-89 MM HG: CPT | Performed by: OBSTETRICS & GYNECOLOGY

## 2021-10-19 NOTE — PROGRESS NOTES
[facility], [facility]  Obstetrics and Gynecology  Prenatal Visit  Paul Andrews MD    HPI   Toi Severance is a 39year old.o. [id number] 26w4d weeks. Here for routine prenatal visit and is without complaints.   Patient denies any regular uterine contractions, pregnancy. Discussed side effects and risks including fever and other constitutional symptoms. Discussed recommendations for flu vaccine in father of baby and other household contacts or regular caregivers. Flu vaccine offered and was given.   Follow up

## 2021-10-26 ENCOUNTER — PATIENT MESSAGE (OUTPATIENT)
Dept: OBGYN CLINIC | Facility: CLINIC | Age: 36
End: 2021-10-26

## 2021-10-27 NOTE — TELEPHONE ENCOUNTER
Karlo Berry RN 10/26/2021 2:25 PM CDT      ----- Message -----  From: Nora Kingston  Sent: 10/26/2021 2:20 PM CDT  To: Lela Paul Ob/Gyne Clinical Staff  Subject: Prenatal Massage     Hi Dr. Damian Escalante! I hope that you are doing well.  I wanted to kno

## 2021-10-27 NOTE — TELEPHONE ENCOUNTER
Letters generated and uploaded to [facility] [employer]. Message sent to patient.

## 2021-10-27 NOTE — TELEPHONE ENCOUNTER
Please provide patient with 2 notes. The first note should state that she may get a prenatal massage. Second note she did state that she may return to work 2 days/week.

## 2021-11-01 ENCOUNTER — PATIENT MESSAGE (OUTPATIENT)
Dept: OBGYN CLINIC | Facility: CLINIC | Age: 36
End: 2021-11-01

## 2021-11-01 ENCOUNTER — TELEPHONE (OUTPATIENT)
Dept: OBGYN CLINIC | Facility: CLINIC | Age: 36
End: 2021-11-01

## 2021-11-01 ENCOUNTER — LAB ENCOUNTER (OUTPATIENT)
Dept: LAB | Facility: HOSPITAL | Age: 36
End: 2021-11-01
Attending: FAMILY MEDICINE
Payer: COMMERCIAL

## 2021-11-01 DIAGNOSIS — Z34.82 ENCOUNTER FOR SUPERVISION OF OTHER NORMAL PREGNANCY IN SECOND TRIMESTER: ICD-10-CM

## 2021-11-01 DIAGNOSIS — O99.810 ABNORMAL MATERNAL GLUCOSE TOLERANCE, ANTEPARTUM: Primary | ICD-10-CM

## 2021-11-01 PROBLEM — O99.019 ANTEPARTUM ANEMIA: Status: ACTIVE | Noted: 2021-11-01

## 2021-11-01 PROBLEM — O99.019 ANTEPARTUM ANEMIA (HCC): Status: ACTIVE | Noted: 2021-11-01

## 2021-11-01 PROCEDURE — 85027 COMPLETE CBC AUTOMATED: CPT

## 2021-11-01 PROCEDURE — 82950 GLUCOSE TEST: CPT

## 2021-11-01 PROCEDURE — 36415 COLL VENOUS BLD VENIPUNCTURE: CPT

## 2021-11-01 PROCEDURE — 84443 ASSAY THYROID STIM HORMONE: CPT

## 2021-11-01 NOTE — TELEPHONE ENCOUNTER
----- Message from Beth Machado MD sent at 11/1/2021  1:21 PM CDT -----  Please notify patient of elevated 50 gram glucola and schedule her for a 3 hour GTT ASAP. In addition, please notify her that her CBC shows that she has anemia.   I have emailed

## 2021-11-02 NOTE — TELEPHONE ENCOUNTER
From: Darvin Kingston  To: Alfie Doss MD, MD  Sent: 11/1/2021 11:22 AM CDT  Subject: Glucose Test    Hi Dr. Clemente Summers. I'm really worried about my glucose test result so I hope to talk with you soon.

## 2021-11-04 ENCOUNTER — LABORATORY ENCOUNTER (OUTPATIENT)
Dept: LAB | Facility: HOSPITAL | Age: 36
End: 2021-11-04
Attending: OBSTETRICS & GYNECOLOGY
Payer: COMMERCIAL

## 2021-11-04 DIAGNOSIS — O99.810 ABNORMAL MATERNAL GLUCOSE TOLERANCE, ANTEPARTUM: ICD-10-CM

## 2021-11-04 PROCEDURE — 82951 GLUCOSE TOLERANCE TEST (GTT): CPT

## 2021-11-04 PROCEDURE — 82952 GTT-ADDED SAMPLES: CPT

## 2021-11-04 PROCEDURE — 36415 COLL VENOUS BLD VENIPUNCTURE: CPT

## 2021-11-09 ENCOUNTER — ROUTINE PRENATAL (OUTPATIENT)
Dept: OBGYN CLINIC | Facility: CLINIC | Age: 36
End: 2021-11-09
Payer: COMMERCIAL

## 2021-11-09 VITALS
HEART RATE: 111 BPM | BODY MASS INDEX: 30 KG/M2 | WEIGHT: 158 LBS | SYSTOLIC BLOOD PRESSURE: 107 MMHG | DIASTOLIC BLOOD PRESSURE: 73 MMHG

## 2021-11-09 DIAGNOSIS — Z34.03 ENCOUNTER FOR SUPERVISION OF NORMAL FIRST PREGNANCY IN THIRD TRIMESTER: Primary | ICD-10-CM

## 2021-11-09 PROCEDURE — 3078F DIAST BP <80 MM HG: CPT | Performed by: OBSTETRICS & GYNECOLOGY

## 2021-11-09 PROCEDURE — 3074F SYST BP LT 130 MM HG: CPT | Performed by: OBSTETRICS & GYNECOLOGY

## 2021-11-09 PROCEDURE — 81002 URINALYSIS NONAUTO W/O SCOPE: CPT | Performed by: OBSTETRICS & GYNECOLOGY

## 2021-11-09 PROCEDURE — 90471 IMMUNIZATION ADMIN: CPT | Performed by: OBSTETRICS & GYNECOLOGY

## 2021-11-09 PROCEDURE — 90715 TDAP VACCINE 7 YRS/> IM: CPT | Performed by: OBSTETRICS & GYNECOLOGY

## 2021-11-09 NOTE — PROGRESS NOTES
[facility], [facility]  Obstetrics and Gynecology  Prenatal Visit  Rosanna Sharp MD    HPI   Rowena Morales is a 39year old.o. [id number] 29w4d weeks. Here for routine prenatal visit and is without complaints.   Patient denies any regular uterine contractions, fatigue, body aches and fever. Pt understands and tdap was offered/given. Follow up with Erika Brown or STEPHANI Tyler in 2 weeks for OB education/28 week visit.       Joselyn Nicole MD, MD  4:25 PM  11/9/2021

## 2021-11-16 ENCOUNTER — TELEPHONE (OUTPATIENT)
Dept: OBGYN CLINIC | Facility: CLINIC | Age: 36
End: 2021-11-16

## 2021-11-16 NOTE — TELEPHONE ENCOUNTER
Pt voices her baby has hiccups sometimes daily, sometimes not at all, but today her baby had hiccups 4 times. Pt voices good fetal movement. No cramping or vaginal bleeding. Advised pt hiccup occurrences can vary daily and are usually benign.

## 2021-11-23 ENCOUNTER — ROUTINE PRENATAL (OUTPATIENT)
Dept: OBGYN CLINIC | Facility: CLINIC | Age: 36
End: 2021-11-23
Payer: COMMERCIAL

## 2021-11-23 VITALS — SYSTOLIC BLOOD PRESSURE: 112 MMHG | WEIGHT: 158 LBS | DIASTOLIC BLOOD PRESSURE: 60 MMHG | BODY MASS INDEX: 30 KG/M2

## 2021-11-23 DIAGNOSIS — F41.9 ANXIETY DISORDER AFFECTING PREGNANCY, ANTEPARTUM: ICD-10-CM

## 2021-11-23 DIAGNOSIS — Z34.83 ENCOUNTER FOR SUPERVISION OF OTHER NORMAL PREGNANCY IN THIRD TRIMESTER: Primary | ICD-10-CM

## 2021-11-23 DIAGNOSIS — O99.019 ANTEPARTUM ANEMIA: ICD-10-CM

## 2021-11-23 DIAGNOSIS — O99.340 ANXIETY DISORDER AFFECTING PREGNANCY, ANTEPARTUM: ICD-10-CM

## 2021-11-23 PROCEDURE — 3074F SYST BP LT 130 MM HG: CPT | Performed by: NURSE PRACTITIONER

## 2021-11-23 PROCEDURE — 81002 URINALYSIS NONAUTO W/O SCOPE: CPT | Performed by: NURSE PRACTITIONER

## 2021-11-23 PROCEDURE — 3078F DIAST BP <80 MM HG: CPT | Performed by: NURSE PRACTITIONER

## 2021-11-23 RX ORDER — BREAST PUMP
EACH MISCELLANEOUS
Qty: 1 EACH | Refills: 0 | OUTPATIENT
Start: 2021-11-23 | End: 2021-12-16

## 2021-11-23 RX ORDER — ASPIRIN 81 MG/1
1 TABLET, CHEWABLE ORAL DAILY
COMMUNITY
End: 2022-01-18

## 2021-11-23 NOTE — PROGRESS NOTES
[facility], [facility]  Obstetrics and Gynecology  28 Week Prenatal Visit  Tia Barone, MSN, APRN, FNP-BC      HPI   Rahel Maurer is a 39year old. [id number] 31w4d weeks.  ALICIA 1/21/2022    Patient feels light headed and weak occasioanlly, reviewed labs, HGB 10 Para Term  AB Living   6 1   1 4 0   SAB IAB Ectopic Multiple Live Births   3 1   0        # Outcome Date GA Lbr Nirmal/2nd Weight Sex Delivery Anes PTL Lv   6 Current            5 SAB 21 6w0d   U SAB   FD   4  20 21w3d 07:23 / 00:06 for circ  - Pediatrician options discussed. Pt undecided at this time  - Options for infant feeding and benefits of breast feeding discussed. Pt elects breastfeeding Pump ordered.   - NB hospital care discussed - Erythromycin, Vitamin K, and Hep B adminis

## 2021-12-03 ENCOUNTER — HOSPITAL ENCOUNTER (OUTPATIENT)
Dept: PERINATAL CARE | Facility: HOSPITAL | Age: 36
Discharge: HOME | End: 2021-12-03
Attending: OBSTETRICS & GYNECOLOGY
Payer: COMMERCIAL

## 2021-12-03 VITALS
WEIGHT: 158 LBS | HEART RATE: 116 BPM | BODY MASS INDEX: 30 KG/M2 | DIASTOLIC BLOOD PRESSURE: 78 MMHG | SYSTOLIC BLOOD PRESSURE: 115 MMHG

## 2021-12-03 DIAGNOSIS — O09.523 MULTIGRAVIDA OF ADVANCED MATERNAL AGE IN THIRD TRIMESTER: ICD-10-CM

## 2021-12-03 DIAGNOSIS — E07.9 THYROID DYSFUNCTION: ICD-10-CM

## 2021-12-03 DIAGNOSIS — O09.299 PRIOR PREGNANCY WITH FETAL DEMISE: Primary | ICD-10-CM

## 2021-12-03 DIAGNOSIS — O09.299 PRIOR PREGNANCY WITH FETAL DEMISE: ICD-10-CM

## 2021-12-03 DIAGNOSIS — Z87.59 H/O FETAL DEMISE, NOT CURRENTLY PREGNANT: ICD-10-CM

## 2021-12-03 PROCEDURE — 76816 OB US FOLLOW-UP PER FETUS: CPT | Performed by: OBSTETRICS & GYNECOLOGY

## 2021-12-03 PROCEDURE — 99212 OFFICE O/P EST SF 10 MIN: CPT | Performed by: OBSTETRICS & GYNECOLOGY

## 2021-12-03 PROCEDURE — 76819 FETAL BIOPHYS PROFIL W/O NST: CPT | Performed by: OBSTETRICS & GYNECOLOGY

## 2021-12-03 NOTE — PROGRESS NOTES
Pedro Nettles    Dear Dr. Page Guillen    Thank you for requesting ultrasound evaluation and maternal fetal medicine consultation on your patient Sharon Kingston.   As you are aware she is a 39year old female  with a singl seen today. The patient understands that ultrasound cannot rule out all structural and chromosomal abnormalities. See PACS/Imaging Tab For Complete Ultrasound Report  I interpreted the results and reviewed them with the patient.     DISCUSSION  During her

## 2021-12-07 ENCOUNTER — ROUTINE PRENATAL (OUTPATIENT)
Dept: OBGYN CLINIC | Facility: CLINIC | Age: 36
End: 2021-12-07
Payer: COMMERCIAL

## 2021-12-07 VITALS
HEART RATE: 105 BPM | DIASTOLIC BLOOD PRESSURE: 80 MMHG | SYSTOLIC BLOOD PRESSURE: 119 MMHG | WEIGHT: 162 LBS | BODY MASS INDEX: 31 KG/M2

## 2021-12-07 DIAGNOSIS — Z34.03 ENCOUNTER FOR SUPERVISION OF NORMAL FIRST PREGNANCY IN THIRD TRIMESTER: Primary | ICD-10-CM

## 2021-12-07 PROCEDURE — 3079F DIAST BP 80-89 MM HG: CPT | Performed by: OBSTETRICS & GYNECOLOGY

## 2021-12-07 PROCEDURE — 81002 URINALYSIS NONAUTO W/O SCOPE: CPT | Performed by: OBSTETRICS & GYNECOLOGY

## 2021-12-07 PROCEDURE — 3074F SYST BP LT 130 MM HG: CPT | Performed by: OBSTETRICS & GYNECOLOGY

## 2021-12-08 NOTE — PROGRESS NOTES
[address] [facility]  Obstetrics and Gynecology  Prenatal Visit  Eunice Wright MD    HPI   Sadaf Montana is a 39year old.o. [id number] 33w4d weeks. Here for routine prenatal visit and is without complaints.   Patient denies any regular uterine contractions, weeks  Orders placed for trimester CBC, treponema and HIV. Patient to go in 2 to 3 weeks.   Tim Campos MD, MD  6:28 PM  12/7/2021

## 2021-12-10 ENCOUNTER — HOSPITAL ENCOUNTER (OUTPATIENT)
Facility: HOSPITAL | Age: 36
Setting detail: OBSERVATION
Discharge: HOME | End: 2021-12-12
Attending: OBSTETRICS & GYNECOLOGY | Admitting: OBSTETRICS & GYNECOLOGY
Payer: COMMERCIAL

## 2021-12-10 ENCOUNTER — TELEPHONE (OUTPATIENT)
Dept: OBGYN CLINIC | Facility: CLINIC | Age: 36
End: 2021-12-10

## 2021-12-10 PROBLEM — O26.93 PREGNANCY RELATED CONDITION IN THIRD TRIMESTER: Status: ACTIVE | Noted: 2021-12-10

## 2021-12-10 PROBLEM — O26.93 PREGNANCY RELATED CONDITION IN THIRD TRIMESTER (HCC): Status: ACTIVE | Noted: 2021-12-10

## 2021-12-10 PROCEDURE — 99220 INITIAL OBSERVATION CARE,LEVL III: CPT | Performed by: OBSTETRICS & GYNECOLOGY

## 2021-12-10 PROCEDURE — 59025 FETAL NON-STRESS TEST: CPT | Performed by: OBSTETRICS & GYNECOLOGY

## 2021-12-10 RX ORDER — SODIUM CHLORIDE, SODIUM LACTATE, POTASSIUM CHLORIDE, CALCIUM CHLORIDE 600; 310; 30; 20 MG/100ML; MG/100ML; MG/100ML; MG/100ML
INJECTION, SOLUTION INTRAVENOUS CONTINUOUS
Status: DISCONTINUED | OUTPATIENT
Start: 2021-12-10 | End: 2021-12-11

## 2021-12-10 NOTE — TELEPHONE ENCOUNTER
Pt is 34 weeks. Pt has been checking blood pressure because of headache and not feeling well. Last night blood pressure 170/xx  This morning 84/55, after eating it is 90/62. Please advise.

## 2021-12-10 NOTE — TELEPHONE ENCOUNTER
Pt voices she was advised by a friend to take her BP's at home because she has been having daily mild headaches. Pt voices her BP's today were 88/55 and 90/62. Pt voices she has mild headache today. Pt denies any other symptoms.  Advised pt to drink 6-8 gla

## 2021-12-10 NOTE — TELEPHONE ENCOUNTER
Pt stated she is prob not drinking and her urine is dark, pt stated she started drinking water and last bp was 100/66, pt asx. Pt had some headaches yesterday and drank coffee and resolved.   Counseled pt on going to the ER with any headaches that wont res

## 2021-12-10 NOTE — TELEPHONE ENCOUNTER
Pt history of still birth last year and is asking if she should expect a call back today from Dr. Axel Nguyen. Pt took blood pressure again, currentlly 97/64    Please advise.

## 2021-12-10 NOTE — TELEPHONE ENCOUNTER
Patient name and  verified. Incoming call received from [facility].unable to call patient due to surgery. To inform patient to recheck blood pressure and if still no improvement patient to go to ED for evaluation. Patient informed.  Patient states she is re

## 2021-12-11 PROCEDURE — 59025 FETAL NON-STRESS TEST: CPT | Performed by: OBSTETRICS & GYNECOLOGY

## 2021-12-11 PROCEDURE — 99204 OFFICE O/P NEW MOD 45 MIN: CPT | Performed by: OTHER

## 2021-12-11 PROCEDURE — 99226 SUBSEQUENT OBSERVATION CARE: CPT | Performed by: OBSTETRICS & GYNECOLOGY

## 2021-12-11 RX ORDER — ACETAMINOPHEN 500 MG
TABLET ORAL
Status: COMPLETED
Start: 2021-12-11 | End: 2021-12-11

## 2021-12-11 RX ORDER — ACETAMINOPHEN 500 MG
500 TABLET ORAL EVERY 6 HOURS PRN
Status: DISCONTINUED | OUTPATIENT
Start: 2021-12-11 | End: 2021-12-12

## 2021-12-11 RX ORDER — BETAMETHASONE SODIUM PHOSPHATE AND BETAMETHASONE ACETATE 3; 3 MG/ML; MG/ML
INJECTION, SUSPENSION INTRA-ARTICULAR; INTRALESIONAL; INTRAMUSCULAR; SOFT TISSUE
Status: COMPLETED
Start: 2021-12-11 | End: 2021-12-11

## 2021-12-11 RX ORDER — CALCIUM CARBONATE 200(500)MG
1000 TABLET,CHEWABLE ORAL 2 TIMES DAILY PRN
Status: DISCONTINUED | OUTPATIENT
Start: 2021-12-11 | End: 2021-12-12

## 2021-12-11 RX ORDER — BETAMETHASONE SODIUM PHOSPHATE AND BETAMETHASONE ACETATE 3; 3 MG/ML; MG/ML
12 INJECTION, SUSPENSION INTRA-ARTICULAR; INTRALESIONAL; INTRAMUSCULAR; SOFT TISSUE EVERY 24 HOURS
Status: COMPLETED | OUTPATIENT
Start: 2021-12-11 | End: 2021-12-12

## 2021-12-11 NOTE — PROGRESS NOTES
HPI:   Julian Funes is a 39year old female who was admitted last night for a headache for the last 5 days, with occasional \"floaters\" on /off for 2 weeks.  Pt denied any visual changes/or abd pain at this time   Stated her headache is very mild at t denies chest pain on exertion  GI: denies abdominal pain,denies heartburn  : denies dysuria, vaginal discharge or itching,periods regular   MUSCULOSKELETAL: denies back pain  NEURO: denies headaches  PSYCHE: denies depression or anxiety  HEMATOLOGIC: den 18 mmol/L 7    BUN   7 - 18 mg/dL 15    Creatinine   0.55 - 1.02 mg/dL 0.63    BUN/CREA Ratio   10.0 - 20.0 23.8 High     Calcium, Total   8.5 - 10.1 mg/dL 8.8    Calculated Osmolality   275 - 295 mOsm/kg 287    GFR, Non-   >=60 116    GFR, weeks. Pt denied any visual changes/or abd pain at this time   Stated her headache is very mild at this time, improved compared to yesterday. Good fm noted.     I called the neurologist on call and requested a consultation ( dr Ava Hadley) and stated he is com

## 2021-12-11 NOTE — PROGRESS NOTES
Pt is a 39year old female admitted to TR1/TR1-A. Patient presents with:  Headache: pt has headache, took her BP and it is low     Pt is  34w0d intra-uterine pregnancy. History obtained, consents signed. Oriented to room, staff, and plan of care.

## 2021-12-11 NOTE — PROGRESS NOTES
Progress note    Pt resting in bed. Headache is now a little better since admission,   Denied any visual changes/abd pain. Feels good fm.   No bleeding  Pt counseled and to be admitted for observation,  Consult called to neurology doctor on call Dr Duane Steve

## 2021-12-11 NOTE — H&P
[facility] Patient Status:  Observation    1985 MRN [id number]   Location [address] [place] [address] Attending Alexandru Lynne MD   Hosp Day # 0 PCP Ольга Jane Allergies/Medications: Allergies:     Latex                   RASH  Shrimp                  ANAPHYLAXIS  Medications:  Misc.  Devices (BREAST PUMP) Does not apply Misc, DOUBLE ELECTRIC BREAST PUMP EQUIVALENT TO MEDELA PUMP IN STYLE, Disp: 1 each, Rfl: Yellow 10/15/2021    CLARITY Clear 10/15/2021    SPECGRAVITY 1.025 12/07/2021    PROUR Negative 10/15/2021    GLUUR Negative 10/15/2021    KETUR Negative 10/15/2021    BILUR Negative 10/15/2021    BLOODURINE Negative 10/15/2021    NITRITE Negative 12/07/20 presented to ER with c/o headache for 4 days,  About 2 week hx of occasional spots in front of her eyes, transient which resolve spontaneously. No current spots. Pt stated she tried tylenol with coffee yesterday which helped resolve headache briefly.  Pt

## 2021-12-12 VITALS
DIASTOLIC BLOOD PRESSURE: 62 MMHG | HEART RATE: 100 BPM | TEMPERATURE: 98 F | RESPIRATION RATE: 16 BRPM | SYSTOLIC BLOOD PRESSURE: 104 MMHG

## 2021-12-12 PROBLEM — O12.13 PROTEINURIA AFFECTING PREGNANCY IN THIRD TRIMESTER (HCC): Status: ACTIVE | Noted: 2021-12-12

## 2021-12-12 PROBLEM — R51.9 SINUS HEADACHE: Status: ACTIVE | Noted: 2021-12-12

## 2021-12-12 PROBLEM — O12.13 PROTEINURIA AFFECTING PREGNANCY IN THIRD TRIMESTER: Status: ACTIVE | Noted: 2021-12-12

## 2021-12-12 PROCEDURE — 59025 FETAL NON-STRESS TEST: CPT | Performed by: OBSTETRICS & GYNECOLOGY

## 2021-12-12 PROCEDURE — 99217 OBSERVATION CARE DISCHARGE: CPT | Performed by: OBSTETRICS & GYNECOLOGY

## 2021-12-12 RX ORDER — CETIRIZINE HYDROCHLORIDE 5 MG/1
5 TABLET ORAL DAILY
Status: DISCONTINUED | OUTPATIENT
Start: 2021-12-12 | End: 2021-12-12

## 2021-12-12 RX ORDER — CETIRIZINE HYDROCHLORIDE 5 MG/1
5 TABLET ORAL DAILY
Refills: 0 | Status: SHIPPED | COMMUNITY
Start: 2021-12-12 | End: 2022-01-18

## 2021-12-12 RX ORDER — ACETAMINOPHEN 500 MG
500 TABLET ORAL EVERY 6 HOURS PRN
Refills: 0 | Status: SHIPPED | COMMUNITY
Start: 2021-12-12

## 2021-12-12 NOTE — DISCHARGE SUMMARY
[facility]    OB/GYNE Discharge Note      Nora Kingston Patient Status:  Inpatient    1985 MRN [id number]   Location [address] [place] [address] Attending Mary Dove, [address] Day # 1 PCP Khris Asencio NSTs recommended. Weekly prenatal visits. Preeclampsia precautions  Delivery by 39 weeks unless hypertension or other signs of preeclampsia develop.         Arron Bhardwaj MD  12/12/2021  8:51 AM

## 2021-12-12 NOTE — CONSULTS
Consult dictated. Headache appears to be sinus related. No features are migraine. No features or clinical findings for pseudotumor cerebri associated with pregnancy. At present time do not believe any neuroimaging of the brain is warranted.   Asked the

## 2021-12-13 ENCOUNTER — HOSPITAL ENCOUNTER (OUTPATIENT)
Dept: PERINATAL CARE | Facility: HOSPITAL | Age: 36
Setting detail: OBSERVATION
Discharge: HOME | End: 2021-12-13
Attending: OBSTETRICS & GYNECOLOGY
Payer: COMMERCIAL

## 2021-12-13 ENCOUNTER — HOSPITAL ENCOUNTER (OUTPATIENT)
Facility: HOSPITAL | Age: 36
Setting detail: OBSERVATION
Discharge: HOME | End: 2021-12-14
Attending: OBSTETRICS & GYNECOLOGY | Admitting: OBSTETRICS & GYNECOLOGY
Payer: COMMERCIAL

## 2021-12-13 ENCOUNTER — TELEPHONE (OUTPATIENT)
Dept: OBGYN CLINIC | Facility: CLINIC | Age: 36
End: 2021-12-13

## 2021-12-13 DIAGNOSIS — Z87.59 H/O FETAL DEMISE, NOT CURRENTLY PREGNANT: ICD-10-CM

## 2021-12-13 DIAGNOSIS — O28.8 NON-STRESS TEST WITH DECELERATIONS: Primary | ICD-10-CM

## 2021-12-13 PROCEDURE — 99220 INITIAL OBSERVATION CARE,LEVL III: CPT | Performed by: OBSTETRICS & GYNECOLOGY

## 2021-12-13 PROCEDURE — 4A0HXCZ MEASUREMENT OF PRODUCTS OF CONCEPTION, CARDIAC RATE, EXTERNAL APPROACH: ICD-10-PCS | Performed by: OBSTETRICS & GYNECOLOGY

## 2021-12-13 PROCEDURE — 76819 FETAL BIOPHYS PROFIL W/O NST: CPT | Performed by: OBSTETRICS & GYNECOLOGY

## 2021-12-13 PROCEDURE — 59025 FETAL NON-STRESS TEST: CPT | Performed by: OBSTETRICS & GYNECOLOGY

## 2021-12-13 RX ORDER — ACETAMINOPHEN 500 MG
1000 TABLET ORAL EVERY 6 HOURS PRN
Status: DISCONTINUED | OUTPATIENT
Start: 2021-12-13 | End: 2021-12-14

## 2021-12-13 RX ORDER — CALCIUM CARBONATE 200(500)MG
1000 TABLET,CHEWABLE ORAL 3 TIMES DAILY PRN
Status: DISCONTINUED | OUTPATIENT
Start: 2021-12-13 | End: 2021-12-14

## 2021-12-13 NOTE — TELEPHONE ENCOUNTER
Pt Name and  verified. OB History     T0    L0    SAB3  IAB1  Ectopic0  Multiple0  Live Births0     Pt is 34w was recently discharged from hospital last night due to early signs of pre-eclampsia.  Pt states that she however has not felt mu

## 2021-12-13 NOTE — H&P
[address] [facility] Patient Status:  Observation    1985 MRN [id number]   Location [address] [place] [address] Attending Heber Cronin MD   Hosp Day # 0 PCP Mohini garcia Allergies/Medications: Allergies:     Latex                   RASH  Shrimp                  ANAPHYLAXIS  Medications:  acetaminophen 500 MG Oral Tab, Take 1 tablet (500 mg total) by mouth every 6 (six) hours as needed. , Disp: , Rfl: 0  cetirizine 5 M Problem List:     Thyroid dysfunction     Vaginal dryness     Exposure to herpes simplex virus (HSV)     H/O fetal demise, not currently pregnant     Prior pregnancy with fetal demise     Pregnancy     Round ligament pain     Antepartum anemia     Pregnanc

## 2021-12-13 NOTE — TELEPHONE ENCOUNTER
Pt discharged yesterday due to preclampsia. Pt concerned about no baby movements today. Please advise if she can come into get baby heartbeat check.

## 2021-12-13 NOTE — CONSULTS
[facility]  Maternal-Fetal Medicine Inpatient Consultation    Date of Admission:  12/13/2021  Date of Consult:  12/13/2021    Reason for Consult:   A maternal-fetal medicine consultation was requested secondary to decreased fetal movement, fe Problems Maternal Grandfather    • No Known Problems Paternal Grandmother    • No Known Problems Paternal Grandfather    • Ovarian Cancer Neg    • Colon Cancer Neg       Social History    Tobacco Use      Smoking status: Never Smoker      Smokeless tobacco AMA–please see previous MFM discussion     PRIOR INTRAUTERINE FETAL DEMISE (IUFD)  The patient had a prior IUFD  At ~20wks.   Please see prior MFM discussion    Isolated proteinuria–she does not pressure, therefore she does not meet criteria for preeclamp the absence of preeclampsia, delivery at 39 weeks    Thank you for allowing me to participate in the care of your patient. Please do not hesitate to contact me if additional questions or concerns arise.   The majority of the time (>50%) was spent in review

## 2021-12-13 NOTE — CONSULTS
[facility]    PATIENT'S NAME: George Kendall   ATTENDING PHYSICIAN: Manoj Drummond MD   CONSULTING PHYSICIAN: Minal Ruelas MD   PATIENT ACCOUNT#:   [de-identified]    LOCATION:  [address] [place] [address] #:   [id number]       DATE OF I discussed with the patient and mother. Also spoke with the nurse and asked the nurse to contact her obstetrician for treatment options of presumed nasal sinus congestion, possible infection. No evidence for pseudotumor cerebri.   I do not believe any ne

## 2021-12-13 NOTE — PAYOR COMM NOTE
--------------  ADMISSION REVIEW     Payor: Jamil Bridges #:  [id number]  Authorization Number: [id number]        OBS STATUS

## 2021-12-13 NOTE — PROGRESS NOTES
Pt is a 39year old female admitted to TR3/TR3-A. Patient presents with:  Decreased Fetal Movement     Pt is  34w3d intra-uterine pregnancy. History obtained, consents signed. Oriented to room, staff, and plan of care.

## 2021-12-14 ENCOUNTER — HOSPITAL ENCOUNTER (INPATIENT)
Dept: PERINATAL CARE | Facility: HOSPITAL | Age: 36
Discharge: HOME | End: 2021-12-14
Attending: OBSTETRICS & GYNECOLOGY
Payer: COMMERCIAL

## 2021-12-14 VITALS
SYSTOLIC BLOOD PRESSURE: 103 MMHG | RESPIRATION RATE: 16 BRPM | HEART RATE: 96 BPM | DIASTOLIC BLOOD PRESSURE: 72 MMHG | TEMPERATURE: 98 F

## 2021-12-14 PROCEDURE — 59025 FETAL NON-STRESS TEST: CPT | Performed by: OBSTETRICS & GYNECOLOGY

## 2021-12-14 PROCEDURE — 99224 SUBSEQUENT OBSERVATION CARE: CPT | Performed by: OBSTETRICS & GYNECOLOGY

## 2021-12-14 PROCEDURE — 76819 FETAL BIOPHYS PROFIL W/O NST: CPT | Performed by: OBSTETRICS & GYNECOLOGY

## 2021-12-14 NOTE — PROGRESS NOTES
[facility]OSP - [facility]    Maternal Fetal Medicine Progress Note    Nathaniel Shepherd Patient Status:  Inpatient    1985 MRN [id number]   Location [address] Attending Cristina Santos MD   Hosp Day # 1 PCP Mohit Ely

## 2021-12-14 NOTE — IMAGING NOTE
Ultrasound Findings:  Single IUP in cephalic presentation. Placenta is posterior. A 3 vessel cord is noted. Cardiac activity is present at 149 bpm  MVP is 6.3 cm . NORA 21.1 cm  BPP is 8/8.

## 2021-12-14 NOTE — PROGRESS NOTES
[facility]    OB/GYNE Progress Note      Roshan Cook  Patient Status:  Inpatient    1985 MRN [id number]   Location [address]  Attending Heber Cronin MD   [facility] Day # 1 PCP 12 Georgiana Hernandez 12/10/2021    BILT 0.6 12/10/2021    TP 6.9 12/10/2021    AST 13 (L) 12/10/2021    ALT 15 12/10/2021    PTT 28.2 04/23/2021    INR 0.97 09/23/2020    TSH 2.990 11/01/2021       Lab Results   Component Value Date    TROP <0.045 06/29/2019    COLORUR Yellow

## 2021-12-15 NOTE — PAYOR COMM NOTE
--------------  ADMISSION REVIEW     Payor: [address] #:  [id number]  Authorization Number: [id number]        [facility] OBS

## 2021-12-16 ENCOUNTER — PATIENT MESSAGE (OUTPATIENT)
Dept: OBGYN CLINIC | Facility: CLINIC | Age: 36
End: 2021-12-16

## 2021-12-16 RX ORDER — BREAST PUMP
EACH MISCELLANEOUS
Qty: 1 EACH | Refills: 0 | Status: SHIPPED | OUTPATIENT
Start: 2021-12-16 | End: 2022-02-01

## 2021-12-17 ENCOUNTER — TELEPHONE (OUTPATIENT)
Dept: OBGYN CLINIC | Facility: CLINIC | Age: 36
End: 2021-12-17

## 2021-12-17 NOTE — TELEPHONE ENCOUNTER
OB History     T0    L0    SAB3  IAB1  Ectopic0  Multiple0  Live Births0   35w0d    Patient complaining of 5/10 headache that does not go away. Has tried tylenol and zytretc without relief.  Has also tried drinking a cafinated beverage without r

## 2021-12-17 NOTE — TELEPHONE ENCOUNTER
Pt has been having headaches for last month all day. Pt went to ER last they found protein in her urine, they gave her 2 steroid shots.

## 2021-12-18 NOTE — TELEPHONE ENCOUNTER
I spoke with patient on the phone and I reviewed her headache history. I reviewed with her the recommendations from neurology that if her headache persisted or worsened that she should be evaluated with an MRI.   I discussed the option/recommendation of co

## 2021-12-20 ENCOUNTER — HOSPITAL ENCOUNTER (OUTPATIENT)
Facility: HOSPITAL | Age: 36
Discharge: HOME | End: 2021-12-20
Attending: OBSTETRICS & GYNECOLOGY | Admitting: OBSTETRICS & GYNECOLOGY
Payer: COMMERCIAL

## 2021-12-20 ENCOUNTER — APPOINTMENT (OUTPATIENT)
Dept: OBGYN CLINIC | Facility: HOSPITAL | Age: 36
End: 2021-12-20
Payer: COMMERCIAL

## 2021-12-20 VITALS
HEART RATE: 104 BPM | SYSTOLIC BLOOD PRESSURE: 112 MMHG | DIASTOLIC BLOOD PRESSURE: 71 MMHG | TEMPERATURE: 98 F | RESPIRATION RATE: 16 BRPM

## 2021-12-20 PROCEDURE — 59025 FETAL NON-STRESS TEST: CPT

## 2021-12-20 PROCEDURE — 59025 FETAL NON-STRESS TEST: CPT | Performed by: ADVANCED PRACTICE MIDWIFE

## 2021-12-20 NOTE — TELEPHONE ENCOUNTER
Jaren Hassan, RN 12/16/2021 11:58 AM CST      ----- Message -----  From: Ferny Kingston  Sent: 12/16/2021 11:55 AM CST  To: Lela Paul Ob/Gyne Clinical Staff  Subject: Questions about birth plan     Hi Dr Price Wilson. I hope that you're doing well!  Alot

## 2021-12-20 NOTE — PAYOR COMM NOTE
--------------  ADMISSION REVIEW     Payor: [address] [place] [address] #:  [id number]  Authorization Number: [id number]

## 2021-12-21 ENCOUNTER — ROUTINE PRENATAL (OUTPATIENT)
Dept: OBGYN CLINIC | Facility: CLINIC | Age: 36
End: 2021-12-21
Payer: COMMERCIAL

## 2021-12-21 VITALS — BODY MASS INDEX: 30 KG/M2 | SYSTOLIC BLOOD PRESSURE: 112 MMHG | WEIGHT: 160.81 LBS | DIASTOLIC BLOOD PRESSURE: 60 MMHG

## 2021-12-21 DIAGNOSIS — O09.529 ANTEPARTUM MULTIGRAVIDA OF ADVANCED MATERNAL AGE: Primary | ICD-10-CM

## 2021-12-21 DIAGNOSIS — O12.13 PROTEINURIA AFFECTING PREGNANCY IN THIRD TRIMESTER: ICD-10-CM

## 2021-12-21 DIAGNOSIS — Z34.83 ENCOUNTER FOR SUPERVISION OF OTHER NORMAL PREGNANCY IN THIRD TRIMESTER: ICD-10-CM

## 2021-12-21 PROCEDURE — 3074F SYST BP LT 130 MM HG: CPT | Performed by: OBSTETRICS & GYNECOLOGY

## 2021-12-21 PROCEDURE — 81003 URINALYSIS AUTO W/O SCOPE: CPT | Performed by: OBSTETRICS & GYNECOLOGY

## 2021-12-21 PROCEDURE — 3078F DIAST BP <80 MM HG: CPT | Performed by: OBSTETRICS & GYNECOLOGY

## 2021-12-21 NOTE — PROGRESS NOTES
Pt is a 39year old female admitted to TR5/TR5-A. Patient presents with:  Non-stress Test: AMA     Pt is [id number] 35w3d intra-uterine pregnancy. History obtained, consents signed. Oriented to room, staff, and plan of care.

## 2021-12-22 ENCOUNTER — NST DOCUMENTATION (OUTPATIENT)
Dept: OBGYN CLINIC | Facility: CLINIC | Age: 36
End: 2021-12-22

## 2021-12-22 PROCEDURE — 59025 FETAL NON-STRESS TEST: CPT | Performed by: OBSTETRICS & GYNECOLOGY

## 2021-12-22 NOTE — NST
Nonstress Test   Patient: Vita Galindo    Gestation: 35w5d    Diagnosis from order:  Proteinuria       NST: reactive         NST DOCUMENTATION 12/20/2021   Variability 6-25 BPM   Accelerations Yes   Decelerations None   Baseline 140   Uterine Irritabi

## 2021-12-22 NOTE — PROGRESS NOTES
[facility], [facility]  Obstetrics and Gynecology  Prenatal Visit  Kami Mccracken MD    VIKTORIYA Rain is a 39year old.o. [id number] 35w4d weeks. Here for routine prenatal visit and is without complaints.   Patient denies any regular uterine contractions, diagnosis)    (Z34.83) Encounter for supervision of other normal pregnancy in third trimester  Plan: URINALYSIS, AUTO, W/O SCOPE    (O12.13) Proteinuria affecting pregnancy in third trimester  Plan: PROTEIN, 24-HR URINE    Plan   Patient to have repeat 24-

## 2021-12-27 ENCOUNTER — APPOINTMENT (OUTPATIENT)
Dept: OBGYN CLINIC | Facility: HOSPITAL | Age: 36
End: 2021-12-27
Payer: COMMERCIAL

## 2021-12-27 ENCOUNTER — HOSPITAL ENCOUNTER (OUTPATIENT)
Facility: HOSPITAL | Age: 36
Discharge: HOME | End: 2021-12-27
Attending: ADVANCED PRACTICE MIDWIFE | Admitting: OBSTETRICS & GYNECOLOGY
Payer: COMMERCIAL

## 2021-12-27 VITALS
SYSTOLIC BLOOD PRESSURE: 116 MMHG | TEMPERATURE: 98 F | HEART RATE: 109 BPM | RESPIRATION RATE: 16 BRPM | DIASTOLIC BLOOD PRESSURE: 71 MMHG

## 2021-12-27 DIAGNOSIS — O12.13 PROTEINURIA AFFECTING PREGNANCY IN THIRD TRIMESTER: ICD-10-CM

## 2021-12-27 LAB
M PROTEIN 24H UR ELPH-MRATE: 218.4 MG/24 HR (ref ?–149.1)
SPECIMEN VOL UR: 800 ML

## 2021-12-27 PROCEDURE — 84156 ASSAY OF PROTEIN URINE: CPT

## 2021-12-27 PROCEDURE — 59025 FETAL NON-STRESS TEST: CPT

## 2021-12-28 ENCOUNTER — TELEPHONE (OUTPATIENT)
Dept: OBGYN CLINIC | Facility: CLINIC | Age: 36
End: 2021-12-28

## 2021-12-28 NOTE — TELEPHONE ENCOUNTER
Patient would like to know the plan of action moving forward. She was in the hospital a couple of times this past month with headaches and protein in her urine. Her high risk doctor mentioned inducing her at 37 weeks.   Dr Savannah Alva had mentioned that date m

## 2021-12-28 NOTE — TELEPHONE ENCOUNTER
The 24 hour Urine for Protein is less than 300 so things are looking better. Patient to continue with her weekly NSTs and should keep her next prenatal visit.

## 2021-12-28 NOTE — TELEPHONE ENCOUNTER
Pt  verified with Pt. Pt was informed about Dr. Wilson Postin recommendations. Pt verbalized understanding. Pt did not have any further questions.

## 2021-12-30 ENCOUNTER — ROUTINE PRENATAL (OUTPATIENT)
Dept: OBGYN CLINIC | Facility: CLINIC | Age: 36
End: 2021-12-30
Payer: COMMERCIAL

## 2021-12-30 VITALS — SYSTOLIC BLOOD PRESSURE: 118 MMHG | DIASTOLIC BLOOD PRESSURE: 81 MMHG | WEIGHT: 160 LBS | BODY MASS INDEX: 30 KG/M2

## 2021-12-30 DIAGNOSIS — Z34.83 ENCOUNTER FOR SUPERVISION OF OTHER NORMAL PREGNANCY IN THIRD TRIMESTER: Primary | ICD-10-CM

## 2021-12-30 PROCEDURE — 81002 URINALYSIS NONAUTO W/O SCOPE: CPT | Performed by: OBSTETRICS & GYNECOLOGY

## 2021-12-30 PROCEDURE — 3079F DIAST BP 80-89 MM HG: CPT | Performed by: OBSTETRICS & GYNECOLOGY

## 2021-12-30 PROCEDURE — 3074F SYST BP LT 130 MM HG: CPT | Performed by: OBSTETRICS & GYNECOLOGY

## 2021-12-30 NOTE — PROGRESS NOTES
Patient having difficulty walking due to severe hip and pelvic pain. Patient desires cervical check today even though symptoms are musculo-skeletal.  Desires to stop working. Patient asked to get paperwork for her job. [employer] Done.   Continue with

## 2022-01-03 ENCOUNTER — HOSPITAL ENCOUNTER (OUTPATIENT)
Facility: HOSPITAL | Age: 37
Discharge: HOME | End: 2022-01-03
Attending: OBSTETRICS & GYNECOLOGY | Admitting: OBSTETRICS & GYNECOLOGY
Payer: COMMERCIAL

## 2022-01-03 ENCOUNTER — APPOINTMENT (OUTPATIENT)
Dept: OBGYN CLINIC | Facility: HOSPITAL | Age: 37
End: 2022-01-03
Payer: COMMERCIAL

## 2022-01-03 VITALS — HEART RATE: 110 BPM | SYSTOLIC BLOOD PRESSURE: 103 MMHG | DIASTOLIC BLOOD PRESSURE: 73 MMHG

## 2022-01-03 PROCEDURE — 59025 FETAL NON-STRESS TEST: CPT

## 2022-01-04 ENCOUNTER — HOSPITAL ENCOUNTER (OUTPATIENT)
Facility: HOSPITAL | Age: 37
Discharge: HOME | End: 2022-01-04
Attending: OBSTETRICS & GYNECOLOGY | Admitting: OBSTETRICS & GYNECOLOGY
Payer: COMMERCIAL

## 2022-01-04 ENCOUNTER — ROUTINE PRENATAL (OUTPATIENT)
Dept: OBGYN CLINIC | Facility: CLINIC | Age: 37
End: 2022-01-04
Payer: COMMERCIAL

## 2022-01-04 VITALS — HEART RATE: 113 BPM | SYSTOLIC BLOOD PRESSURE: 105 MMHG | TEMPERATURE: 98 F | DIASTOLIC BLOOD PRESSURE: 73 MMHG

## 2022-01-04 VITALS — BODY MASS INDEX: 31 KG/M2 | SYSTOLIC BLOOD PRESSURE: 102 MMHG | WEIGHT: 162.19 LBS | DIASTOLIC BLOOD PRESSURE: 61 MMHG

## 2022-01-04 DIAGNOSIS — Z34.83 ENCOUNTER FOR SUPERVISION OF OTHER NORMAL PREGNANCY IN THIRD TRIMESTER: Primary | ICD-10-CM

## 2022-01-04 PROBLEM — O09.523 MULTIGRAVIDA OF ADVANCED MATERNAL AGE IN THIRD TRIMESTER (HCC): Status: ACTIVE | Noted: 2020-07-30

## 2022-01-04 PROBLEM — O09.523 MULTIGRAVIDA OF ADVANCED MATERNAL AGE IN THIRD TRIMESTER: Status: ACTIVE | Noted: 2020-07-30

## 2022-01-04 PROCEDURE — 59025 FETAL NON-STRESS TEST: CPT | Performed by: OBSTETRICS & GYNECOLOGY

## 2022-01-04 PROCEDURE — 3074F SYST BP LT 130 MM HG: CPT | Performed by: OBSTETRICS & GYNECOLOGY

## 2022-01-04 PROCEDURE — 3078F DIAST BP <80 MM HG: CPT | Performed by: OBSTETRICS & GYNECOLOGY

## 2022-01-04 PROCEDURE — 81003 URINALYSIS AUTO W/O SCOPE: CPT | Performed by: OBSTETRICS & GYNECOLOGY

## 2022-01-04 RX ORDER — LEVOTHYROXINE SODIUM 0.12 MG/1
125 TABLET ORAL EVERY MORNING
COMMUNITY
Start: 2021-12-08

## 2022-01-04 NOTE — PROGRESS NOTES
Pt is a 39year old female admitted to TR3/TR3-A. Patient presents with:  Non-stress Test: NST for AMA     Pt is [id number] 37w3d intra-uterine pregnancy. History obtained, consents signed. Oriented to room, staff, and plan of care.

## 2022-01-05 NOTE — PROGRESS NOTES
Pt stated she is feeling fetal movement, pt stated there was a time during today when movements were not the same as before, pt requested nst for reassurrance,  Pt sent to [facility] triage now. Triage notified.

## 2022-01-05 NOTE — TRIAGE
[facility]      Triage Note    Maxim Kingston Patient Status:  Outpatient    1985 MRN [id number]   Location [address]  Attending Maurilio Klein MD   Hosp Day # 0 PCP Magen Neff Temp: 98.2 °F (36.8 °C)   TempSrc: Oral       NST  Variability: Moderate           Accelerations: Yes           Decelerations: None            Baseline: 140 BPM           Uterine Irritability: No           Contractions: Not present

## 2022-01-06 ENCOUNTER — NST DOCUMENTATION (OUTPATIENT)
Dept: OBGYN CLINIC | Facility: CLINIC | Age: 37
End: 2022-01-06

## 2022-01-06 NOTE — NST
Nonstress Test   Patient: Ashley Galicia    Gestation: 37w6d    Diagnosis from order:   Advanced Maternal Age       NST: Reactive NST on 12/20/21         NST DOCUMENTATION 1/4/2022   Variability 6-25 BPM   Accelerations Yes   Decelerations None   Baseli

## 2022-01-06 NOTE — TELEPHONE ENCOUNTER
Patient called to see if we rec'd forms. She is trying to have Dr. Phil Wright approve her off 2 weeks earlier due to issues.  I explained we need his approval, form logged, and [facility] sent for HIPAA

## 2022-01-07 NOTE — TELEPHONE ENCOUNTER
Dr. Shelbie Gunn,     Patient sent you a message asking to stop work 2 weeks early ALICIA:1/21. I explained we need a medical reason and doctors approval. Do you support and what diagnosis should we use?     Thank Ana Cristina Gomez

## 2022-01-09 ENCOUNTER — NST DOCUMENTATION (OUTPATIENT)
Dept: OBGYN CLINIC | Facility: CLINIC | Age: 37
End: 2022-01-09

## 2022-01-09 ENCOUNTER — HOSPITAL ENCOUNTER (OUTPATIENT)
Facility: HOSPITAL | Age: 37
Discharge: HOME | End: 2022-01-09
Attending: OBSTETRICS & GYNECOLOGY | Admitting: OBSTETRICS & GYNECOLOGY
Payer: COMMERCIAL

## 2022-01-09 VITALS
SYSTOLIC BLOOD PRESSURE: 118 MMHG | TEMPERATURE: 98 F | HEART RATE: 101 BPM | RESPIRATION RATE: 16 BRPM | DIASTOLIC BLOOD PRESSURE: 79 MMHG

## 2022-01-09 DIAGNOSIS — O26.93 PREGNANCY RELATED CONDITION IN THIRD TRIMESTER: ICD-10-CM

## 2022-01-09 PROCEDURE — 59025 FETAL NON-STRESS TEST: CPT

## 2022-01-09 PROCEDURE — 81002 URINALYSIS NONAUTO W/O SCOPE: CPT

## 2022-01-09 PROCEDURE — 59025 FETAL NON-STRESS TEST: CPT | Performed by: OBSTETRICS & GYNECOLOGY

## 2022-01-09 PROCEDURE — 99213 OFFICE O/P EST LOW 20 MIN: CPT

## 2022-01-09 NOTE — PROGRESS NOTES
Pt is a 39year old female admitted to TR2/TR2-A. Patient presents with:  R/o Labor: contractions; since 4pm on 21     Pt is  38w2d intra-uterine pregnancy. History obtained, pt. Oriented to room, staff, and plan of care.

## 2022-01-09 NOTE — NST
Nonstress Test   Patient: Leigh Weston    Gestation: 38w2d    Diagnosis from order: Inpatient order, no diagnosis associated       NST:         NST DOCUMENTATION 1/9/2022   Variability -   Accelerations -   Decelerations -   Baseline -   Uterine Irrit

## 2022-01-10 ENCOUNTER — APPOINTMENT (OUTPATIENT)
Dept: OBGYN CLINIC | Facility: HOSPITAL | Age: 37
End: 2022-01-10
Payer: COMMERCIAL

## 2022-01-10 ENCOUNTER — HOSPITAL ENCOUNTER (OUTPATIENT)
Facility: HOSPITAL | Age: 37
Discharge: HOME | End: 2022-01-10
Attending: OBSTETRICS & GYNECOLOGY | Admitting: OBSTETRICS & GYNECOLOGY
Payer: COMMERCIAL

## 2022-01-10 DIAGNOSIS — O09.529 AMA (ADVANCED MATERNAL AGE) MULTIGRAVIDA 35+: ICD-10-CM

## 2022-01-10 PROCEDURE — 59025 FETAL NON-STRESS TEST: CPT | Performed by: OBSTETRICS & GYNECOLOGY

## 2022-01-11 ENCOUNTER — TELEPHONE (OUTPATIENT)
Dept: OBGYN CLINIC | Facility: CLINIC | Age: 37
End: 2022-01-11

## 2022-01-11 NOTE — TELEPHONE ENCOUNTER
Pt lost mucous plus a couple of weeks ago. Pt will soon be 39 weeks. Pt has mild contractions, but told not to worry. Pt is asking about being induced. Please advise.

## 2022-01-11 NOTE — TELEPHONE ENCOUNTER
Pt Name and  verified. OB History     T0    L0    SAB3  IAB1  Ectopic0  Multiple0  Live Births0     Pt states that she has some contractions but not consistent. Sometimes they are every 20 mins, but not close together. Pt states that she is very uncomfortable and is trying to see if she can possibly be induced. Pt was checked on  and had some mild spotting after. States that she was not told if she was dilated, advised that cervix may be closed by the nurses. Pt would like a message to be sent to [facility] to see if she may possibly be induced.  pls advise

## 2022-01-11 NOTE — TRIAGE
[facility]      Triage Note    Nissa Kingston Patient Status:  Outpatient    1985 MRN [id number]   Location [address]  Attending Ludmila Rios MD   [facility] Day # 0 PCP [address] visit.    NST  Variability: Moderate           Accelerations: Yes           Decelerations: None            Baseline: 130 BPM           Uterine Irritability: Yes           Contractions: Not present                                        Acoustic Stimulator:

## 2022-01-12 LAB
BILIRUB UR QL STRIP: NEGATIVE
CLARITY UR: CLEAR
COLOR UR: YELLOW
GLUCOSE UR STRIP-MCNC: NEGATIVE MG/DL
KETONES UR STRIP-MCNC: NEGATIVE MG/DL
LEUKOCYTE ESTERASE UR QL STRIP: NEGATIVE
NITRITE UR QL STRIP: NEGATIVE
PH UR STRIP: 7 [PH]
PROT UR STRIP-MCNC: NEGATIVE MG/DL
SP GR UR STRIP: 1.01
UROBILINOGEN UR STRIP-ACNC: <2 MG/DL

## 2022-01-12 NOTE — TELEPHONE ENCOUNTER
Pt mother is calling Pt has headaches every day . Pt has a lot of anxiety issues .  Mother is asking to speak to nurse ,   Pt mother is asking  If  You can call her daughter ,

## 2022-01-12 NOTE — TELEPHONE ENCOUNTER
Pt left a VM on 1/11/2022 checking status of forms, forms received on 12/28/2021, sent pt a [employer] message explaining current turnaround time.

## 2022-01-12 NOTE — TELEPHONE ENCOUNTER
OB History     T0    L0    SAB3  IAB1  Ectopic0  Multiple0  Live Births0   38w5d    Patient states she is very uncomfortable and feels \"miserable\" complaining of headaches, all day heartburn, and mild inconsistent contractions. Informed patient of all messages INDIRA has sent. MARVIN gave approval to begin maternity leave. Letter generated and sent via [employer]. Patient agreeable to appt with INDIRA on 2022. Advised patient if symptoms begin to worsen to contact the office. Verbalized understanding.

## 2022-01-12 NOTE — TELEPHONE ENCOUNTER
Please notify patient that without a medical indication, we do not induce patients prior to 39 weeks. Please offer her an appointment with me at [facility] on Thursday 1/13/2022 at 5 pm.  I will evaluate her cervix and discuss options for IOL at that time.

## 2022-01-13 ENCOUNTER — HOSPITAL ENCOUNTER (INPATIENT)
Facility: HOSPITAL | Age: 37
LOS: 5 days | Discharge: HOME | End: 2022-01-18
Attending: OBSTETRICS & GYNECOLOGY | Admitting: OBSTETRICS & GYNECOLOGY
Payer: COMMERCIAL

## 2022-01-13 ENCOUNTER — APPOINTMENT (OUTPATIENT)
Dept: OBGYN CLINIC | Facility: HOSPITAL | Age: 37
End: 2022-01-13
Attending: OBSTETRICS & GYNECOLOGY
Payer: COMMERCIAL

## 2022-01-13 PROBLEM — O09.293 PRIOR PREGNANCY WITH FETAL DEMISE AND CURRENT PREGNANCY, THIRD TRIMESTER: Status: ACTIVE | Noted: 2022-01-13

## 2022-01-13 PROBLEM — O09.293: Status: ACTIVE | Noted: 2022-01-13

## 2022-01-13 LAB
ANTIBODY SCREEN: NEGATIVE
BASOPHILS # BLD AUTO: 0.03 X10(3) UL (ref 0–0.2)
BASOPHILS NFR BLD AUTO: 0.4 %
DEPRECATED RDW RBC AUTO: 50.6 FL (ref 35.1–46.3)
EOSINOPHIL # BLD AUTO: 0.03 X10(3) UL (ref 0–0.7)
EOSINOPHIL NFR BLD AUTO: 0.4 %
ERYTHROCYTE [DISTWIDTH] IN BLOOD BY AUTOMATED COUNT: 13.6 % (ref 11–15)
HCT VFR BLD AUTO: 37.5 %
HGB BLD-MCNC: 12.3 G/DL
IMM GRANULOCYTES # BLD AUTO: 0.08 X10(3) UL (ref 0–1)
IMM GRANULOCYTES NFR BLD: 1 %
LYMPHOCYTES # BLD AUTO: 1.62 X10(3) UL (ref 1–4)
LYMPHOCYTES NFR BLD AUTO: 19.3 %
MCH RBC QN AUTO: 33.2 PG (ref 26–34)
MCHC RBC AUTO-ENTMCNC: 32.8 G/DL (ref 31–37)
MCV RBC AUTO: 101.1 FL
MONOCYTES # BLD AUTO: 0.72 X10(3) UL (ref 0.1–1)
MONOCYTES NFR BLD AUTO: 8.6 %
NEUTROPHILS # BLD AUTO: 5.9 X10 (3) UL (ref 1.5–7.7)
NEUTROPHILS # BLD AUTO: 5.9 X10(3) UL (ref 1.5–7.7)
NEUTROPHILS NFR BLD AUTO: 70.3 %
PLATELET # BLD AUTO: 367 10(3)UL (ref 150–450)
RBC # BLD AUTO: 3.71 X10(6)UL
RH BLOOD TYPE: POSITIVE
SARS-COV-2 RNA RESP QL NAA+PROBE: NOT DETECTED
WBC # BLD AUTO: 8.4 X10(3) UL (ref 4–11)

## 2022-01-13 RX ORDER — TERBUTALINE SULFATE 1 MG/ML
0.25 INJECTION, SOLUTION SUBCUTANEOUS AS NEEDED
Status: DISCONTINUED | OUTPATIENT
Start: 2022-01-13 | End: 2022-01-15

## 2022-01-13 RX ORDER — SODIUM CHLORIDE, SODIUM LACTATE, POTASSIUM CHLORIDE, CALCIUM CHLORIDE 600; 310; 30; 20 MG/100ML; MG/100ML; MG/100ML; MG/100ML
INJECTION, SOLUTION INTRAVENOUS CONTINUOUS
Status: DISCONTINUED | OUTPATIENT
Start: 2022-01-13 | End: 2022-01-15

## 2022-01-13 RX ORDER — SODIUM CHLORIDE 0.9 % (FLUSH) 0.9 %
2 SYRINGE (ML) INJECTION AS NEEDED
Status: DISCONTINUED | OUTPATIENT
Start: 2022-01-13 | End: 2022-01-15

## 2022-01-13 RX ORDER — LIDOCAINE HYDROCHLORIDE 10 MG/ML
30 INJECTION, SOLUTION EPIDURAL; INFILTRATION; INTRACAUDAL; PERINEURAL ONCE
Status: DISCONTINUED | OUTPATIENT
Start: 2022-01-13 | End: 2022-01-15

## 2022-01-13 RX ORDER — SODIUM CHLORIDE 0.9 % (FLUSH) 0.9 %
2 SYRINGE (ML) INJECTION EVERY 8 HOURS
Status: DISCONTINUED | OUTPATIENT
Start: 2022-01-13 | End: 2022-01-15

## 2022-01-13 RX ORDER — IBUPROFEN 600 MG/1
600 TABLET ORAL ONCE AS NEEDED
Status: DISCONTINUED | OUTPATIENT
Start: 2022-01-13 | End: 2022-01-15

## 2022-01-13 RX ORDER — AMMONIA INHALANTS 0.04 G/.3ML
0.3 INHALANT RESPIRATORY (INHALATION) AS NEEDED
Status: DISCONTINUED | OUTPATIENT
Start: 2022-01-13 | End: 2022-01-15

## 2022-01-13 RX ORDER — DIPHENHYDRAMINE HCL 25 MG
25 CAPSULE ORAL NIGHTLY PRN
Status: DISCONTINUED | OUTPATIENT
Start: 2022-01-13 | End: 2022-01-15

## 2022-01-13 RX ORDER — TRISODIUM CITRATE DIHYDRATE AND CITRIC ACID MONOHYDRATE 500; 334 MG/5ML; MG/5ML
30 SOLUTION ORAL AS NEEDED
Status: COMPLETED | OUTPATIENT
Start: 2022-01-13 | End: 2022-01-15

## 2022-01-13 NOTE — PROGRESS NOTES
Pt is a 39year old female admitted to 377/377-A. Patient presents with:  Scheduled Induction: AMA, hx FD     Pt is  38w6d intra-uterine pregnancy. History obtained, consents signed. Oriented to room, staff, and plan of care.

## 2022-01-14 ENCOUNTER — ANESTHESIA (OUTPATIENT)
Dept: OBGYN UNIT | Facility: HOSPITAL | Age: 37
End: 2022-01-14
Payer: COMMERCIAL

## 2022-01-14 ENCOUNTER — ANESTHESIA EVENT (OUTPATIENT)
Dept: OBGYN UNIT | Facility: HOSPITAL | Age: 37
End: 2022-01-14
Payer: COMMERCIAL

## 2022-01-14 PROCEDURE — 3E0P7VZ INTRODUCTION OF HORMONE INTO FEMALE REPRODUCTIVE, VIA NATURAL OR ARTIFICIAL OPENING: ICD-10-PCS | Performed by: OBSTETRICS & GYNECOLOGY

## 2022-01-14 RX ORDER — BUPIVACAINE HCL/0.9 % NACL/PF 0.25 %
5 PLASTIC BAG, INJECTION (ML) EPIDURAL AS NEEDED
Status: DISCONTINUED | OUTPATIENT
Start: 2022-01-14 | End: 2022-01-15

## 2022-01-14 RX ORDER — NALBUPHINE HCL 10 MG/ML
10 AMPUL (ML) INJECTION EVERY 4 HOURS PRN
Status: DISCONTINUED | OUTPATIENT
Start: 2022-01-14 | End: 2022-01-15

## 2022-01-14 RX ORDER — HYDROXYZINE HYDROCHLORIDE 50 MG/ML
50 INJECTION, SOLUTION INTRAMUSCULAR EVERY 4 HOURS PRN
Status: DISCONTINUED | OUTPATIENT
Start: 2022-01-14 | End: 2022-01-15

## 2022-01-14 RX ORDER — BUPIVACAINE HYDROCHLORIDE 2.5 MG/ML
20 INJECTION, SOLUTION EPIDURAL; INFILTRATION; INTRACAUDAL ONCE
Status: COMPLETED | OUTPATIENT
Start: 2022-01-14 | End: 2022-01-14

## 2022-01-14 RX ORDER — MISOPROSTOL 200 UG/1
TABLET ORAL
Status: DISPENSED
Start: 2022-01-14 | End: 2022-01-14

## 2022-01-14 RX ORDER — LIDOCAINE HYDROCHLORIDE AND EPINEPHRINE 15; 5 MG/ML; UG/ML
INJECTION, SOLUTION EPIDURAL
Status: COMPLETED | OUTPATIENT
Start: 2022-01-14 | End: 2022-01-14

## 2022-01-14 RX ORDER — LIDOCAINE HYDROCHLORIDE 10 MG/ML
INJECTION, SOLUTION EPIDURAL; INFILTRATION; INTRACAUDAL; PERINEURAL AS NEEDED
Status: DISCONTINUED | OUTPATIENT
Start: 2022-01-14 | End: 2022-01-15 | Stop reason: SURG

## 2022-01-14 RX ORDER — NALBUPHINE HCL 10 MG/ML
2.5 AMPUL (ML) INJECTION
Status: DISCONTINUED | OUTPATIENT
Start: 2022-01-14 | End: 2022-01-15

## 2022-01-14 RX ADMIN — LIDOCAINE HYDROCHLORIDE 3 ML: 10 INJECTION, SOLUTION EPIDURAL; INFILTRATION; INTRACAUDAL; PERINEURAL at 22:02:00

## 2022-01-14 RX ADMIN — BUPIVACAINE HYDROCHLORIDE 3 ML: 2.5 INJECTION, SOLUTION EPIDURAL; INFILTRATION; INTRACAUDAL at 22:14:00

## 2022-01-14 RX ADMIN — LIDOCAINE HYDROCHLORIDE AND EPINEPHRINE 3 ML: 15; 5 INJECTION, SOLUTION EPIDURAL at 22:11:00

## 2022-01-14 NOTE — H&P
[facility] Patient Status:  Inpatient    1985 MRN [id number]   Location [address] [place] [address] Attending Jeevan Jones MD   Hosp Day # 0 PCP Octavio Erazo V     Date of for intraepithelial lesion or malignancy  Negative for intraepithelial lesion or malignancy 10/03/19 1850    HPV  Negative  Negative 10/03/19 1850    GC DNA        Chlamydia DNA        GTT 1 Hr        Glucose Fasting        Glucose 1 Hr        Glucose 2 Hr 10.7 g/dL 12.0 - 16.0 12/10/21 2233    Platelets  291.3 09(7).0 - 450.0 01/13/22 1805       316.0 10(3)uL 150.0 - 450.0 12/13/21 1430       303.0 10(3)uL 150.0 - 450.0 12/10/21 2233    TREP  Negative  Negative 12/12/21 0814    RPR ([facility])        Lyly Gluteal Augmentation 2017 and Revision in 2018     Family History:   Family History   Problem Relation Age of Onset   • Thyroid disease Father    • Thyroid disease Mother    • Breast Cancer Other 52        One of 5 sisters-none others with breast CA   • Ca 104  BP: (103)/(69) 103/69  Patient Vitals for the past 24 hrs:   BP Pulse   01/13/22 1745 103/69 104       Constitutional: alert and cooperative  Respiratory: clear to auscultation bilaterally  Cardiac: regular rate and rhythm  Abdomen: Gravid, nontender 12/13/2021    RH Positive 12/13/2021    WBC 8.4 01/13/2022    HGB 12.3 01/13/2022    HCT 37.5 01/13/2022    .0 01/13/2022    CREATSERUM 0.63 12/10/2021    BUN 15 12/10/2021     12/10/2021    K 3.5 12/10/2021     12/10/2021    CO2 23.0 12

## 2022-01-14 NOTE — PROGRESS NOTES
[facility]    Labor Progress Note    Gustavo Bread Judd Fuel Patient Status:  Inpatient    1985 MRN [id number]   Location [address]  Attending Meena Mc MD   Hosp Day # 1 PCP Ciro CLARITY Clear 10/15/2021    SPECGRAVITY 1.015 01/09/2022    PROUR Negative 10/15/2021    GLUUR Negative 01/09/2022    KETUR Negative 10/15/2021    BILUR Negative 10/15/2021    BLOODURINE Negative 10/15/2021    NITRITE Negative 12/30/2021    UROBILINOGEN <2

## 2022-01-15 PROCEDURE — 3E033VJ INTRODUCTION OF OTHER HORMONE INTO PERIPHERAL VEIN, PERCUTANEOUS APPROACH: ICD-10-PCS | Performed by: OBSTETRICS & GYNECOLOGY

## 2022-01-15 PROCEDURE — 59510 CESAREAN DELIVERY: CPT | Performed by: OBSTETRICS & GYNECOLOGY

## 2022-01-15 RX ORDER — CEFAZOLIN SODIUM/WATER 2 G/20 ML
2 SYRINGE (ML) INTRAVENOUS ONCE
Status: COMPLETED | OUTPATIENT
Start: 2022-01-15 | End: 2022-01-15

## 2022-01-15 RX ORDER — HYDROMORPHONE HYDROCHLORIDE 1 MG/ML
0.4 INJECTION, SOLUTION INTRAMUSCULAR; INTRAVENOUS; SUBCUTANEOUS
Status: DISCONTINUED | OUTPATIENT
Start: 2022-01-15 | End: 2022-01-16

## 2022-01-15 RX ORDER — METOCLOPRAMIDE HYDROCHLORIDE 5 MG/ML
10 INJECTION INTRAMUSCULAR; INTRAVENOUS ONCE
Status: COMPLETED | OUTPATIENT
Start: 2022-01-15 | End: 2022-01-15

## 2022-01-15 RX ORDER — SIMETHICONE 80 MG
80 TABLET,CHEWABLE ORAL 3 TIMES DAILY PRN
Status: DISCONTINUED | OUTPATIENT
Start: 2022-01-15 | End: 2022-01-18

## 2022-01-15 RX ORDER — NALBUPHINE HCL 10 MG/ML
2.5 AMPUL (ML) INJECTION EVERY 4 HOURS PRN
Status: DISCONTINUED | OUTPATIENT
Start: 2022-01-15 | End: 2022-01-16

## 2022-01-15 RX ORDER — HYDROMORPHONE HYDROCHLORIDE 1 MG/ML
0.6 INJECTION, SOLUTION INTRAMUSCULAR; INTRAVENOUS; SUBCUTANEOUS
Status: DISCONTINUED | OUTPATIENT
Start: 2022-01-15 | End: 2022-01-16

## 2022-01-15 RX ORDER — ENOXAPARIN SODIUM 100 MG/ML
40 INJECTION SUBCUTANEOUS NIGHTLY
Status: DISCONTINUED | OUTPATIENT
Start: 2022-01-16 | End: 2022-01-18

## 2022-01-15 RX ORDER — BISACODYL 10 MG
10 SUPPOSITORY, RECTAL RECTAL ONCE AS NEEDED
Status: COMPLETED | OUTPATIENT
Start: 2022-01-15 | End: 2022-01-18

## 2022-01-15 RX ORDER — GABAPENTIN 300 MG/1
300 CAPSULE ORAL EVERY 8 HOURS PRN
Status: DISCONTINUED | OUTPATIENT
Start: 2022-01-15 | End: 2022-01-18

## 2022-01-15 RX ORDER — MORPHINE SULFATE 1 MG/ML
INJECTION, SOLUTION EPIDURAL; INTRATHECAL; INTRAVENOUS AS NEEDED
Status: DISCONTINUED | OUTPATIENT
Start: 2022-01-15 | End: 2022-01-15 | Stop reason: SURG

## 2022-01-15 RX ORDER — EPHEDRINE SULFATE 50 MG/ML
INJECTION INTRAVENOUS AS NEEDED
Status: DISCONTINUED | OUTPATIENT
Start: 2022-01-15 | End: 2022-01-15 | Stop reason: SURG

## 2022-01-15 RX ORDER — CHOLECALCIFEROL (VITAMIN D3) 25 MCG
1 TABLET,CHEWABLE ORAL DAILY
Status: DISCONTINUED | OUTPATIENT
Start: 2022-01-15 | End: 2022-01-18

## 2022-01-15 RX ORDER — DIPHENHYDRAMINE HYDROCHLORIDE 50 MG/ML
12.5 INJECTION INTRAMUSCULAR; INTRAVENOUS EVERY 4 HOURS PRN
Status: DISCONTINUED | OUTPATIENT
Start: 2022-01-15 | End: 2022-01-16

## 2022-01-15 RX ORDER — OXYCODONE HYDROCHLORIDE 5 MG/1
5 TABLET ORAL EVERY 6 HOURS PRN
Status: DISCONTINUED | OUTPATIENT
Start: 2022-01-16 | End: 2022-01-18

## 2022-01-15 RX ORDER — ACETAMINOPHEN 325 MG/1
650 TABLET ORAL EVERY 6 HOURS PRN
Status: DISCONTINUED | OUTPATIENT
Start: 2022-01-15 | End: 2022-01-16

## 2022-01-15 RX ORDER — ONDANSETRON 2 MG/ML
4 INJECTION INTRAMUSCULAR; INTRAVENOUS EVERY 6 HOURS PRN
Status: DISCONTINUED | OUTPATIENT
Start: 2022-01-15 | End: 2022-01-18

## 2022-01-15 RX ORDER — MELATONIN
325
Status: DISCONTINUED | OUTPATIENT
Start: 2022-01-16 | End: 2022-01-18

## 2022-01-15 RX ORDER — HYDROMORPHONE HYDROCHLORIDE 1 MG/ML
0.3 INJECTION, SOLUTION INTRAMUSCULAR; INTRAVENOUS; SUBCUTANEOUS EVERY 2 HOUR PRN
Status: DISCONTINUED | OUTPATIENT
Start: 2022-01-16 | End: 2022-01-18

## 2022-01-15 RX ORDER — HYDROCODONE BITARTRATE AND ACETAMINOPHEN 7.5; 325 MG/1; MG/1
2 TABLET ORAL EVERY 6 HOURS PRN
Status: DISCONTINUED | OUTPATIENT
Start: 2022-01-15 | End: 2022-01-16

## 2022-01-15 RX ORDER — HALOPERIDOL 5 MG/ML
0.5 INJECTION INTRAMUSCULAR ONCE AS NEEDED
Status: ACTIVE | OUTPATIENT
Start: 2022-01-15 | End: 2022-01-15

## 2022-01-15 RX ORDER — CEFAZOLIN SODIUM/WATER 2 G/20 ML
SYRINGE (ML) INTRAVENOUS
Status: DISPENSED
Start: 2022-01-15 | End: 2022-01-16

## 2022-01-15 RX ORDER — FAMOTIDINE 10 MG/ML
20 INJECTION, SOLUTION INTRAVENOUS ONCE
Status: COMPLETED | OUTPATIENT
Start: 2022-01-15 | End: 2022-01-15

## 2022-01-15 RX ORDER — DIPHENHYDRAMINE HCL 25 MG
25 CAPSULE ORAL EVERY 4 HOURS PRN
Status: DISCONTINUED | OUTPATIENT
Start: 2022-01-15 | End: 2022-01-16

## 2022-01-15 RX ORDER — ONDANSETRON 2 MG/ML
4 INJECTION INTRAMUSCULAR; INTRAVENOUS ONCE AS NEEDED
Status: ACTIVE | OUTPATIENT
Start: 2022-01-15 | End: 2022-01-15

## 2022-01-15 RX ORDER — DEXTROSE, SODIUM CHLORIDE, SODIUM LACTATE, POTASSIUM CHLORIDE, AND CALCIUM CHLORIDE 5; .6; .31; .03; .02 G/100ML; G/100ML; G/100ML; G/100ML; G/100ML
INJECTION, SOLUTION INTRAVENOUS CONTINUOUS
Status: DISCONTINUED | OUTPATIENT
Start: 2022-01-15 | End: 2022-01-18

## 2022-01-15 RX ORDER — NALOXONE HYDROCHLORIDE 0.4 MG/ML
0.08 INJECTION, SOLUTION INTRAMUSCULAR; INTRAVENOUS; SUBCUTANEOUS
Status: DISCONTINUED | OUTPATIENT
Start: 2022-01-15 | End: 2022-01-16

## 2022-01-15 RX ORDER — FAMOTIDINE 20 MG/1
20 TABLET ORAL ONCE
Status: COMPLETED | OUTPATIENT
Start: 2022-01-15 | End: 2022-01-15

## 2022-01-15 RX ORDER — FAMOTIDINE 10 MG/ML
INJECTION, SOLUTION INTRAVENOUS
Status: COMPLETED
Start: 2022-01-15 | End: 2022-01-15

## 2022-01-15 RX ORDER — METOCLOPRAMIDE 10 MG/1
10 TABLET ORAL ONCE
Status: COMPLETED | OUTPATIENT
Start: 2022-01-15 | End: 2022-01-15

## 2022-01-15 RX ORDER — AMMONIA INHALANTS 0.04 G/.3ML
0.3 INHALANT RESPIRATORY (INHALATION) AS NEEDED
Status: DISCONTINUED | OUTPATIENT
Start: 2022-01-15 | End: 2022-01-18

## 2022-01-15 RX ORDER — METOCLOPRAMIDE HYDROCHLORIDE 5 MG/ML
INJECTION INTRAMUSCULAR; INTRAVENOUS
Status: COMPLETED
Start: 2022-01-15 | End: 2022-01-15

## 2022-01-15 RX ORDER — DOCUSATE SODIUM 100 MG/1
100 CAPSULE, LIQUID FILLED ORAL
Status: DISCONTINUED | OUTPATIENT
Start: 2022-01-15 | End: 2022-01-16

## 2022-01-15 RX ORDER — ACETAMINOPHEN 500 MG
1000 TABLET ORAL EVERY 6 HOURS
Status: DISCONTINUED | OUTPATIENT
Start: 2022-01-15 | End: 2022-01-18

## 2022-01-15 RX ORDER — HYDROCODONE BITARTRATE AND ACETAMINOPHEN 7.5; 325 MG/1; MG/1
1 TABLET ORAL EVERY 6 HOURS PRN
Status: DISCONTINUED | OUTPATIENT
Start: 2022-01-15 | End: 2022-01-16

## 2022-01-15 RX ORDER — PROCHLORPERAZINE EDISYLATE 5 MG/ML
5 INJECTION INTRAMUSCULAR; INTRAVENOUS ONCE AS NEEDED
Status: ACTIVE | OUTPATIENT
Start: 2022-01-15 | End: 2022-01-15

## 2022-01-15 RX ORDER — LIDOCAINE HYDROCHLORIDE AND EPINEPHRINE 20; 5 MG/ML; UG/ML
INJECTION, SOLUTION EPIDURAL; INFILTRATION; INTRACAUDAL; PERINEURAL AS NEEDED
Status: DISCONTINUED | OUTPATIENT
Start: 2022-01-15 | End: 2022-01-15 | Stop reason: SURG

## 2022-01-15 RX ADMIN — LIDOCAINE HYDROCHLORIDE AND EPINEPHRINE 10 ML: 20; 5 INJECTION, SOLUTION EPIDURAL; INFILTRATION; INTRACAUDAL; PERINEURAL at 15:25:00

## 2022-01-15 RX ADMIN — EPHEDRINE SULFATE 5 MG: 50 INJECTION INTRAVENOUS at 15:55:00

## 2022-01-15 RX ADMIN — LIDOCAINE HYDROCHLORIDE AND EPINEPHRINE 3 ML: 20; 5 INJECTION, SOLUTION EPIDURAL; INFILTRATION; INTRACAUDAL; PERINEURAL at 15:23:00

## 2022-01-15 RX ADMIN — SODIUM CHLORIDE, SODIUM LACTATE, POTASSIUM CHLORIDE, CALCIUM CHLORIDE: 600; 310; 30; 20 INJECTION, SOLUTION INTRAVENOUS at 14:52:00

## 2022-01-15 RX ADMIN — LIDOCAINE HYDROCHLORIDE AND EPINEPHRINE 2 ML: 20; 5 INJECTION, SOLUTION EPIDURAL; INFILTRATION; INTRACAUDAL; PERINEURAL at 15:27:00

## 2022-01-15 RX ADMIN — EPHEDRINE SULFATE 5 MG: 50 INJECTION INTRAVENOUS at 15:57:00

## 2022-01-15 RX ADMIN — MORPHINE SULFATE 3 MG: 1 INJECTION, SOLUTION EPIDURAL; INTRATHECAL; INTRAVENOUS at 15:29:00

## 2022-01-15 RX ADMIN — SODIUM CHLORIDE, SODIUM LACTATE, POTASSIUM CHLORIDE, CALCIUM CHLORIDE: 600; 310; 30; 20 INJECTION, SOLUTION INTRAVENOUS at 15:51:00

## 2022-01-15 RX ADMIN — CEFAZOLIN SODIUM/WATER 2 G: 2 G/20 ML SYRINGE (ML) INTRAVENOUS at 15:23:00

## 2022-01-15 NOTE — PROGRESS NOTES
Progress note    Pt noted to have repetitive decels, pitocin turned off, O2 started, pt counseled on options of management including continued induction after a rest period vs primary  section, pt declined further labor and requested a primary cesa

## 2022-01-15 NOTE — PROGRESS NOTES
[facility]    Labor Progress Note    Levon Alisa Mckinney Patient Status:  Inpatient    1985 MRN [id number]   Location [address] [facility] Attending Shanna Morales MD   Hosp Day # 2 PCP Ciro

## 2022-01-15 NOTE — ANESTHESIA PREPROCEDURE EVALUATION
Anesthesia PreOp Note    HPI:     Bee De La Fuente is a 39year old female who presents for preoperative consultation requested by: * No surgeons listed *    Date of Surgery: 1/14/2022    * No procedures listed *  Indication: * No pre-op diagnosis entered (325 mg total) by mouth daily with breakfast., Disp: 30 tablet, Rfl: 3, 1/12/2022 at Unknown time  prenatal multivitamin plus DHA 27-0.8-228 MG Oral Cap, Take 1 capsule by mouth daily. , Disp: , Rfl: , 1/12/2022 at Unknown time  Levothyroxine Sodium 100 MCG Benigno Segura MD  Normal Saline Flush 0.9 % injection 2 mL, 2 mL, Intravenous, PRN, FitzerRyanne MD  lidocaine PF (XYLOCAINE) 1% injection, 30 mL, Intradermal, Once, Benigno Segura MD  ibuprofen (MOTRIN) tab 600 mg, 600 mg, Oral, Once PRN, F Currently      Drug use: Never      Sexual activity: Not on file    Other Topics      Concerns:        Not on file    Social History Narrative      Not on file    Social Determinants of Health  Financial Resource Strain: Not on file  Food Insecurity: Not o risks of neuraxial anesthesia including, but not limited to: failure, headache, backache, spinal, unilateral/patchy block, difficulty breathing, infection, bleeding, nerve damage, paralysis, death.  The patient desires the proposed neuraxial anesthetic as p

## 2022-01-15 NOTE — PROGRESS NOTES
[facility]    Labor Progress Note    [facility] Milly Conrad Patient Status:  Inpatient    1985 MRN [id number]   Location [address] [place] [address] Attending Ricci Hui MD   Hosp Day # 2 PCP Ciro

## 2022-01-15 NOTE — ANESTHESIA PROCEDURE NOTES
Labor Analgesia    Date/Time: 1/14/2022 10:11 PM  Performed by: Mariano Chinchilla MD  Authorized by: Mariano Chinchilla MD       General Information and Staff    Start Time:  1/14/2022 10:02 PM  End Time:  1/14/2022 10:19 PM  Anesthesiologist:  Mariano Chinchilla

## 2022-01-15 NOTE — ANESTHESIA POSTPROCEDURE EVALUATION
Patient: Henry Olivier    Procedure Summary     Date: 22 Room / Location: [address] [place] [address] L+D OR  [facility] L+D OR    Anesthesia Start:  Anesthesia Stop:     Procedure:  SECTION (N/A ) Diagnosis: (fetal intolerance)    Surgeons: Unique Arias

## 2022-01-15 NOTE — PROGRESS NOTES
[facility] - [facility]    Labor Progress Note    Jaxson Elizabeth Patient Status:  Inpatient    1985 MRN [id number]   Location [facility] Attending Rom Chavira MD   Hosp Day # 1 PCP Ciro

## 2022-01-15 NOTE — L&D DELIVERY NOTE
[facility]     Section Delivery Note    Gregg Kingston Patient Status:  Inpatient    1985 MRN [id number]   Location [address]  Attending Sia Forte MD   Hosp Day # 2 PCP Kelvin Chino

## 2022-01-16 LAB
BASOPHILS # BLD AUTO: 0.05 X10(3) UL (ref 0–0.2)
BASOPHILS NFR BLD AUTO: 0.3 %
DEPRECATED RDW RBC AUTO: 50.3 FL (ref 35.1–46.3)
EOSINOPHIL # BLD AUTO: 0.06 X10(3) UL (ref 0–0.7)
EOSINOPHIL NFR BLD AUTO: 0.4 %
ERYTHROCYTE [DISTWIDTH] IN BLOOD BY AUTOMATED COUNT: 13.4 % (ref 11–15)
HCT VFR BLD AUTO: 29.8 %
HGB BLD-MCNC: 9.7 G/DL
IMM GRANULOCYTES # BLD AUTO: 0.08 X10(3) UL (ref 0–1)
IMM GRANULOCYTES NFR BLD: 0.5 %
LYMPHOCYTES # BLD AUTO: 2.01 X10(3) UL (ref 1–4)
LYMPHOCYTES NFR BLD AUTO: 12.4 %
MCH RBC QN AUTO: 33.1 PG (ref 26–34)
MCHC RBC AUTO-ENTMCNC: 32.6 G/DL (ref 31–37)
MCV RBC AUTO: 101.7 FL
MONOCYTES # BLD AUTO: 1.52 X10(3) UL (ref 0.1–1)
MONOCYTES NFR BLD AUTO: 9.4 %
NEUTROPHILS # BLD AUTO: 12.44 X10 (3) UL (ref 1.5–7.7)
NEUTROPHILS # BLD AUTO: 12.44 X10(3) UL (ref 1.5–7.7)
NEUTROPHILS NFR BLD AUTO: 77 %
PLATELET # BLD AUTO: 286 10(3)UL (ref 150–450)
RBC # BLD AUTO: 2.93 X10(6)UL
WBC # BLD AUTO: 16.2 X10(3) UL (ref 4–11)

## 2022-01-16 RX ORDER — DOCUSATE SODIUM 100 MG/1
100 CAPSULE, LIQUID FILLED ORAL 2 TIMES DAILY
Status: DISCONTINUED | OUTPATIENT
Start: 2022-01-16 | End: 2022-01-18

## 2022-01-16 RX ORDER — MELATONIN
325
Status: DISCONTINUED | OUTPATIENT
Start: 2022-01-16 | End: 2022-01-16

## 2022-01-16 NOTE — PROGRESS NOTES
Pt received into room 351. Pt transferred via cart. Report received from \[facility]\"". Assessment and vitals WNL. Pt oriented to room, bed in lowest position, locked, siderails up x2, call light within reach. POC reviewed.

## 2022-01-16 NOTE — PROGRESS NOTES
Patient transferred to mother/baby room 351 per cart in stable condition with baby and personal belongings. Accompanied by  and staff. Report given to [facility].

## 2022-01-16 NOTE — PLAN OF CARE
Problem: PAIN - ADULT  Goal: Verbalizes/displays adequate comfort level or patient's stated pain goal  Description: INTERVENTIONS:  - Encourage pt to monitor pain and request assistance  - Assess pain using appropriate pain scale  - Administer analgesics interventions         Outcome: Progressing  Goal: Patient/Family Short Term Goal  Description: Patient's Short Term Goal:  Comfort and Pain Control    Interventions:  -Non Pharmacological pain interventions  -IV/IM and epidural pain medication per physicia Monitor I&O, WT and lab values  - Obtain nutritional consult as needed  - Evaluate psychosocial factors contributing to over-consumption  Outcome: Progressing     Problem: POSTPARTUM  Goal: Long Term Goal:Experiences normal postpartum course  Description: lactation challenges. - Recommend avoidance of specific medications or substances incompatible with breast feeding.  - Assess and monitor for signs of nipple pain/trauma. - Instruct and provide assistance with proper latch.   - Review techniques for milk

## 2022-01-16 NOTE — PROGRESS NOTES
[facility]   Acute Pain Rounds Note  2022    Patient name: Odette Valiente 39year old female  : 1985  MRN: [id number]    Diagnosis: [unfilled]    S/P: C section    Pain modality: Duramorph    Current hospital day: Hospital Day: 4

## 2022-01-16 NOTE — PROGRESS NOTES
POD 1    Pt feels well. No c/o. Alert and oriented nad  abd soft, nontender, fundus firm, dressing c/d/i  Ext nt  scds    A/p POD 1 pt doing well. fe daily. abd binder, ambulate qid or more.

## 2022-01-16 NOTE — OPERATIVE REPORT
[facility]    PATIENT'S NAME: Lylyshelby Nielsen   ATTENDING PHYSICIAN: Maxime Meza MD   OPERATING PHYSICIAN: Howie Keita MD   PATIENT ACCOUNT#:   [de-identified]    LOCATION:  [address] [place] [address] #:   [id number]       DATE OF B weighing 3300 g. Apgars 9 at one minute, 9 at five minutes. 2.   Fetal vertex noted to have a caput and was noted to be asynclitic, left occiput transverse.     OPERATIVE TECHNIQUE:  The patient was taken to the operating room and after epidural anesthesi running interlocking stitch followed by 0 Vicryl suture with imbricating stitch. Excellent hemostasis was then noted. Ovaries and tubes were grossly normal as was the uterus.   Irrigation was performed, after which, the uterus, ovaries, and tubes were rep

## 2022-01-17 NOTE — PLAN OF CARE
Problem: PAIN - ADULT  Goal: Verbalizes/displays adequate comfort level or patient's stated pain goal  Description: INTERVENTIONS:  - Encourage pt to monitor pain and request assistance  - Assess pain using appropriate pain scale  - Administer analgesics interventions         Outcome: Progressing  Goal: Patient/Family Short Term Goal  Description: Patient's Short Term Goal:  Comfort and Pain Control    Interventions:  -Non Pharmacological pain interventions  -IV/IM and epidural pain medication per physicia breast pumps). - Encourage mother/other family members to express feelings/concerns, and actively listen. - Educate father/SO about benefits of breast feeding and how to manage common lactation challenges.   - Recommend avoidance of specific medications o

## 2022-01-17 NOTE — LACTATION NOTE
LACTATION NOTE - MOTHER      Evaluation Type: Inpatient    Problems identified  Problems identified: Knowledge deficit;Milk supply not WNL  Milk supply not WNL: Reduced (potential)  Problems Identified Other: supplementing with formula per physician sandor

## 2022-01-17 NOTE — LACTATION NOTE
This note was copied from a baby's chart.   LACTATION NOTE - INFANT    Evaluation Type  Evaluation Type: Inpatient    Problems & Assessment  Problems Diagnosed or Identified: Latch difficulty  Problems: comment/detail: latching well on left side; having dif

## 2022-01-17 NOTE — PROGRESS NOTES
POD 2    Pt doing well. Pt eating well. Ambulating well. Passing flatus. Good pain control    Alert and oriented, nad  abd soft, nontender, fundus firm  Ext  Nt,  scds    A/p POD 2 pt doing well. No c/o. Pt counseled, all questions answered.

## 2022-01-18 VITALS
OXYGEN SATURATION: 100 % | TEMPERATURE: 98 F | BODY MASS INDEX: 30.58 KG/M2 | WEIGHT: 162 LBS | RESPIRATION RATE: 16 BRPM | HEART RATE: 104 BPM | SYSTOLIC BLOOD PRESSURE: 118 MMHG | DIASTOLIC BLOOD PRESSURE: 74 MMHG | HEIGHT: 61 IN

## 2022-01-18 RX ORDER — OXYCODONE HYDROCHLORIDE 5 MG/1
5 TABLET ORAL EVERY 6 HOURS PRN
Qty: 15 TABLET | Refills: 0 | Status: SHIPPED | OUTPATIENT
Start: 2022-01-18

## 2022-01-18 RX ORDER — NALOXONE HYDROCHLORIDE 4 MG/.1ML
4 SPRAY, METERED NASAL AS NEEDED
Qty: 1 KIT | Refills: 0 | Status: SHIPPED | OUTPATIENT
Start: 2022-01-18

## 2022-01-18 NOTE — DISCHARGE SUMMARY
[facility]    Discharge Summary    Leeroy Fatimah Kingston Patient Status:  Inpatient    1985 MRN [id number]   Location [facility] 3SE Attending Santana Cardozo MD   Hosp Day # 5 PCP Chris Mckoy V     Date of Admission:  by mouth every morning. Ferrous Sulfate 325 (65 Fe) MG Oral Tab  Take 1 tablet (325 mg total) by mouth daily with breakfast.    prenatal multivitamin plus DHA 27-0.8-228 MG Oral Cap  Take 1 capsule by mouth daily. Misc.  Devices (BREAST PUMP) Does not

## 2022-01-18 NOTE — PAYOR COMM NOTE
--------------  CONTINUED STAY REVIEW    Payor: [address] #:  [id number]  Authorization Number: [id number]    Admit date: 1/13/22  Admit time:  5:01 PM    Admitting Physician: Alvin Rogers MD  Attending Physician Dallin Petersen RN    1/17/2022 2340 Given 5 mg Oral Dallin Petersen RN    1/17/2022 1627 Given 5 mg Oral Nils Johnson RN      prenatal vitamin w/DHA (PNV with DHA) cap 1 capsule     Date Action Dose Route User    1/17/2022 1004 Given 1 capsule Oral Media,

## 2022-01-18 NOTE — LACTATION NOTE
This note was copied from a baby's chart.   LACTATION NOTE - INFANT    Evaluation Type  Evaluation Type: Inpatient    Problems & Assessment  Problems Diagnosed or Identified: Latch difficulty  Problems: comment/detail: states no colostrum on left-demo hand

## 2022-01-19 ENCOUNTER — TELEPHONE (OUTPATIENT)
Dept: OBGYN CLINIC | Facility: CLINIC | Age: 37
End: 2022-01-19

## 2022-01-19 NOTE — TELEPHONE ENCOUNTER
verified with pt. Pt was schedule for staple removal for  at 9:30am. Pt verbalized understanding. Pt did not have any further questions.

## 2022-01-19 NOTE — TELEPHONE ENCOUNTER
Pt Name and  verified. Pt states that when she bent down she noticed some pulling on one of her staples. She does not have any pain, just tenderness, no bleeding or drainage.  Pt offered apt to come in but she said she has apt set up for this Saturday

## 2022-01-19 NOTE — TELEPHONE ENCOUNTER
Patient bent down to  a pacifier and now feels as if one of the staples is prickly (in her words). There is no blood. Please advise.

## 2022-01-20 ENCOUNTER — HOSPITAL ENCOUNTER (EMERGENCY)
Facility: HOSPITAL | Age: 37
Discharge: HOME | End: 2022-01-20
Attending: EMERGENCY MEDICINE
Payer: COMMERCIAL

## 2022-01-20 VITALS
SYSTOLIC BLOOD PRESSURE: 124 MMHG | OXYGEN SATURATION: 99 % | DIASTOLIC BLOOD PRESSURE: 80 MMHG | HEART RATE: 93 BPM | TEMPERATURE: 99 F | RESPIRATION RATE: 18 BRPM

## 2022-01-20 DIAGNOSIS — Z48.89 ENCOUNTER FOR POST SURGICAL WOUND CHECK: Primary | ICD-10-CM

## 2022-01-20 PROCEDURE — 99282 EMERGENCY DEPT VISIT SF MDM: CPT

## 2022-01-20 NOTE — ED PROVIDER NOTES
Patient Seen in: [facility] AND [facility] Emergency Department    History   Patient presents with:  Postop/Procedure Problem    Stated Complaint: post op complication, delivered on 1/15, staples pulling    HPI    HPI: Kimmie Little is a 39year old female w Smokeless tobacco: Never Used    Vaping Use      Vaping Use: Never used    Alcohol use: Not Currently    Drug use: Never      Review of Systems    Positive for stated complaint: post op complication, delivered on 1/15, staples pulling  Other systems are as

## 2022-01-22 ENCOUNTER — NURSE ONLY (OUTPATIENT)
Dept: OBGYN CLINIC | Facility: CLINIC | Age: 37
End: 2022-01-22
Payer: COMMERCIAL

## 2022-01-22 ENCOUNTER — TELEPHONE (OUTPATIENT)
Dept: OBGYN CLINIC | Facility: CLINIC | Age: 37
End: 2022-01-22

## 2022-01-22 VITALS — SYSTOLIC BLOOD PRESSURE: 149 MMHG | DIASTOLIC BLOOD PRESSURE: 89 MMHG

## 2022-01-22 PROCEDURE — 3077F SYST BP >= 140 MM HG: CPT | Performed by: OBSTETRICS & GYNECOLOGY

## 2022-01-22 PROCEDURE — 3079F DIAST BP 80-89 MM HG: CPT | Performed by: OBSTETRICS & GYNECOLOGY

## 2022-01-22 NOTE — PROGRESS NOTES
Patient name and  verified. Patient presents to clinic for staple removal. Incision is well approximated and intact. No signs of infection noted. Staples removed, cleansed with normal saline and hydrogen peroxide, and steri strips applied.  Patient em

## 2022-01-22 NOTE — TELEPHONE ENCOUNTER
Patient seen in office today for staple removal. Patient complaining of pain and states does not have anymore oxycodone. Has tried otc tylenol for pain without relief.  Advised patient can try using Motrin 600 mg 6-8 hours prn for pain and rest. States if a

## 2022-01-24 ENCOUNTER — TELEPHONE (OUTPATIENT)
Dept: OBGYN CLINIC | Facility: CLINIC | Age: 37
End: 2022-01-24

## 2022-01-24 RX ORDER — IBUPROFEN 600 MG/1
600 TABLET ORAL EVERY 6 HOURS PRN
Qty: 60 TABLET | Refills: 0 | Status: SHIPPED
Start: 2022-01-24

## 2022-01-24 NOTE — TELEPHONE ENCOUNTER
Pt Name and  verified. Pt states that her BP is 151/90, pt had a  on 1/15/22. Pt states that her BP was elevated at her apt on Saturday.  Pt's bp was 149/89, pt attributed to being nervous for the staple removal. Pt states that she took a b

## 2022-01-24 NOTE — TELEPHONE ENCOUNTER
Please offer either Tylenol 500 mg, disp 60, sig one tab po q4-6  hours prn,   or if pt prefers can send norco 5 , disp 15,  sig one tab po q6-8 hours prn pain,  may not take tylenol and norco together.    Pt may take motrin 600 mg, disp 60, sig one tab po

## 2022-01-24 NOTE — TELEPHONE ENCOUNTER
Per ASJ pt to go to nearest ED. Pt informed and voiced understanding. She will go to nearest ED now to be evaluated. No further questions.

## 2022-01-24 NOTE — TELEPHONE ENCOUNTER
Patient name and  verified. Patient informed of ASJ message and recommendations. Patient would like Motrin 600mg. States still in pain and blood pressures at home are in the 130's. Advised patient to monitor pain and blood pressure.  Aware to contact

## 2022-01-24 NOTE — TELEPHONE ENCOUNTER
Patient is concerned with how high her blood pressure is. Current reading is 151/90. She took a baby aspirin. Please advise.

## 2022-01-28 ENCOUNTER — TELEPHONE (OUTPATIENT)
Dept: OBGYN CLINIC | Facility: CLINIC | Age: 37
End: 2022-01-28

## 2022-01-28 NOTE — TELEPHONE ENCOUNTER
Pt missed apt today with ASJ for post op. Pt had a C section with ASJ on 1/15.  Pt needs to see ASJ nothing available until 2/9 because Dr is out

## 2022-01-28 NOTE — TELEPHONE ENCOUNTER
Patient unable to make it to 2 week check today due to weather and . appt made for 2/1/2022 with [facility].

## 2022-01-31 NOTE — TELEPHONE ENCOUNTER
Dr. Ford Carney     Please sign off on form: FMLA  -Highlight the patient and hit \"Chart\" button. -In Chart Review, w/in the Encounter tab - click 1 time on the Telephone call encounter for 12/28/21 Scroll down the telephone encounter.  -Click \"scan on\" blue Hyperlink under \"Media\" heading for FMLA Dr. Ford Carney 01-31-22  w/in the telephone enc.  -Click on Acknowledge button at the bottom right corner and left-click onto image, signature stamp appears and drag signature to Provider signature line. Stamp will turn blue. Close window.      Thank you,

## 2022-02-01 ENCOUNTER — OFFICE VISIT (OUTPATIENT)
Dept: OBGYN CLINIC | Facility: CLINIC | Age: 37
End: 2022-02-01
Payer: COMMERCIAL

## 2022-02-01 VITALS — SYSTOLIC BLOOD PRESSURE: 120 MMHG | DIASTOLIC BLOOD PRESSURE: 70 MMHG | WEIGHT: 141 LBS | BODY MASS INDEX: 27 KG/M2

## 2022-02-01 DIAGNOSIS — R35.0 URINARY FREQUENCY: Primary | ICD-10-CM

## 2022-02-01 DIAGNOSIS — Z11.3 SCREENING EXAMINATION FOR SEXUALLY TRANSMITTED DISEASE: ICD-10-CM

## 2022-02-01 LAB
APPEARANCE: CLEAR
APPEARANCE: CLEAR
BILIRUBIN: NEGATIVE
BILIRUBIN: NEGATIVE
GLUCOSE (URINE DIPSTICK): NEGATIVE MG/DL
GLUCOSE (URINE DIPSTICK): NEGATIVE MG/DL
KETONES (URINE DIPSTICK): NEGATIVE MG/DL
KETONES (URINE DIPSTICK): NEGATIVE MG/DL
LEUKOCYTES: NEGATIVE
LEUKOCYTES: NEGATIVE
MULTISTIX LOT#: NORMAL NUMERIC
NITRITE, URINE: NEGATIVE
NITRITE, URINE: NEGATIVE
OCCULT BLOOD: NEGATIVE
OCCULT BLOOD: NEGATIVE
PH, URINE: 6 (ref 4.5–8)
PH, URINE: 6 (ref 4.5–8)
PROTEIN (URINE DIPSTICK): NEGATIVE MG/DL
SPECIFIC GRAVITY: 1.02 (ref 1–1.03)
SPECIFIC GRAVITY: 1.02 (ref 1–1.03)
URINE-COLOR: YELLOW
URINE-COLOR: YELLOW
UROBILINOGEN,SEMI-QN: 1 MG/DL (ref 0–1.9)
UROBILINOGEN,SEMI-QN: 1 MG/DL (ref 0–1.9)

## 2022-02-01 PROCEDURE — 3074F SYST BP LT 130 MM HG: CPT | Performed by: NURSE PRACTITIONER

## 2022-02-01 PROCEDURE — 3078F DIAST BP <80 MM HG: CPT | Performed by: NURSE PRACTITIONER

## 2022-02-01 PROCEDURE — 81000 URINALYSIS NONAUTO W/SCOPE: CPT | Performed by: NURSE PRACTITIONER

## 2022-02-05 LAB
ATOPOBIUM VAGINAE: 7.6 LOG (CELLS/ML)
C. ALBICANS, DNA: NOT DETECTED
C. GLABRATA, DNA: NOT DETECTED
C. PARAPSILOSIS, DNA: NOT DETECTED
C. TROPICALIS, DNA: NOT DETECTED
CHLAMYDIA TRACHOMATIS$RNA, TMA: NOT DETECTED
GARDNERELLA VAGINALIS: 7.1 LOG (CELLS/ML)
LACTOBACILLUS SPECIES: NOT DETECTED LOG (CELLS/ML)
MEGASPHAERA SPECIES: 7.2 LOG (CELLS/ML)
NEISSERIA GONORRHOEAE$RNA, TMA: NOT DETECTED
SURESWAB(R) TRICHOMONAS$VAGINALIS RNA, QL TMA: NOT DETECTED

## 2022-02-06 RX ORDER — METRONIDAZOLE 7.5 MG/G
1 GEL VAGINAL NIGHTLY
Qty: 1 EACH | Refills: 0 | Status: SHIPPED | OUTPATIENT
Start: 2022-02-06 | End: 2022-02-11

## 2022-02-08 ENCOUNTER — TELEPHONE (OUTPATIENT)
Dept: OBGYN UNIT | Facility: HOSPITAL | Age: 37
End: 2022-02-08

## 2022-02-26 ENCOUNTER — POSTPARTUM (OUTPATIENT)
Dept: OBGYN CLINIC | Facility: CLINIC | Age: 37
End: 2022-02-26
Payer: COMMERCIAL

## 2022-02-26 VITALS — DIASTOLIC BLOOD PRESSURE: 74 MMHG | SYSTOLIC BLOOD PRESSURE: 120 MMHG | BODY MASS INDEX: 26 KG/M2 | WEIGHT: 136.63 LBS

## 2022-02-26 PROCEDURE — 3074F SYST BP LT 130 MM HG: CPT | Performed by: OBSTETRICS & GYNECOLOGY

## 2022-02-26 PROCEDURE — 3078F DIAST BP <80 MM HG: CPT | Performed by: OBSTETRICS & GYNECOLOGY

## 2022-02-26 RX ORDER — LEVOTHYROXINE SODIUM 112 UG/1
112 TABLET ORAL DAILY
COMMUNITY
Start: 2022-01-31 | End: 2022-03-24

## 2022-03-22 ENCOUNTER — OFFICE VISIT (OUTPATIENT)
Dept: OBGYN CLINIC | Facility: CLINIC | Age: 37
End: 2022-03-22
Payer: COMMERCIAL

## 2022-03-22 VITALS — SYSTOLIC BLOOD PRESSURE: 100 MMHG | DIASTOLIC BLOOD PRESSURE: 74 MMHG | BODY MASS INDEX: 26 KG/M2 | WEIGHT: 138 LBS

## 2022-03-22 DIAGNOSIS — G43.109 MIGRAINE WITH AURA AND WITHOUT STATUS MIGRAINOSUS, NOT INTRACTABLE: ICD-10-CM

## 2022-03-22 DIAGNOSIS — Z30.09 COUNSELING FOR BIRTH CONTROL REGARDING INTRAUTERINE DEVICE (IUD): Primary | ICD-10-CM

## 2022-03-22 PROCEDURE — 99212 OFFICE O/P EST SF 10 MIN: CPT | Performed by: NURSE PRACTITIONER

## 2022-03-22 PROCEDURE — 3078F DIAST BP <80 MM HG: CPT | Performed by: NURSE PRACTITIONER

## 2022-03-22 PROCEDURE — 3074F SYST BP LT 130 MM HG: CPT | Performed by: NURSE PRACTITIONER

## 2022-04-13 NOTE — TELEPHONE ENCOUNTER
Patient requesting to speak with nurse regarding high anxiety, wants to cry all the time. Please call at [phone],FELISA.

## 2022-04-13 NOTE — TELEPHONE ENCOUNTER
Patient name and  verified. Patient states she has crying and anxiety episodes for no reason. Patient denies any triggers. Denies wanting to harm self or baby. States she \"loves being a mom and loves her baby so much\" but has feelings of anxiety and a feeling of doom in the pit of her stomach. Reassurance given to patient. Agreeable to behavior health referral. Advised patient for worsening symptoms patient to be seen in ED. Verbalized understanding.

## 2023-07-06 ENCOUNTER — HOSPITAL ENCOUNTER (EMERGENCY)
Facility: HOSPITAL | Age: 38
Discharge: HOME | End: 2023-07-06
Attending: EMERGENCY MEDICINE
Payer: COMMERCIAL

## 2023-07-06 VITALS
HEART RATE: 88 BPM | DIASTOLIC BLOOD PRESSURE: 75 MMHG | TEMPERATURE: 98 F | OXYGEN SATURATION: 100 % | RESPIRATION RATE: 18 BRPM | SYSTOLIC BLOOD PRESSURE: 106 MMHG

## 2023-07-06 DIAGNOSIS — R51.9 NONINTRACTABLE EPISODIC HEADACHE, UNSPECIFIED HEADACHE TYPE: Primary | ICD-10-CM

## 2023-07-06 DIAGNOSIS — Z34.90 EARLY STAGE OF PREGNANCY: ICD-10-CM

## 2023-07-06 LAB
ANION GAP SERPL CALC-SCNC: 7 MMOL/L (ref 0–18)
B-HCG UR QL: POSITIVE
BASOPHILS # BLD AUTO: 0.02 X10(3) UL (ref 0–0.2)
BASOPHILS NFR BLD AUTO: 0.3 %
BUN BLD-MCNC: 11 MG/DL (ref 7–18)
BUN/CREAT SERPL: 14.7 (ref 10–20)
CALCIUM BLD-MCNC: 8.6 MG/DL (ref 8.5–10.1)
CHLORIDE SERPL-SCNC: 108 MMOL/L (ref 98–112)
CO2 SERPL-SCNC: 23 MMOL/L (ref 21–32)
CREAT BLD-MCNC: 0.75 MG/DL
DEPRECATED RDW RBC AUTO: 40.9 FL (ref 35.1–46.3)
EOSINOPHIL # BLD AUTO: 0.07 X10(3) UL (ref 0–0.7)
EOSINOPHIL NFR BLD AUTO: 1.1 %
ERYTHROCYTE [DISTWIDTH] IN BLOOD BY AUTOMATED COUNT: 11.9 % (ref 11–15)
GFR SERPLBLD BASED ON 1.73 SQ M-ARVRAT: 104 ML/MIN/1.73M2 (ref 60–?)
GLUCOSE BLD-MCNC: 91 MG/DL (ref 70–99)
HCT VFR BLD AUTO: 39.2 %
HGB BLD-MCNC: 13.2 G/DL
IMM GRANULOCYTES # BLD AUTO: 0.02 X10(3) UL (ref 0–1)
IMM GRANULOCYTES NFR BLD: 0.3 %
LYMPHOCYTES # BLD AUTO: 1.47 X10(3) UL (ref 1–4)
LYMPHOCYTES NFR BLD AUTO: 23.7 %
MCH RBC QN AUTO: 32.3 PG (ref 26–34)
MCHC RBC AUTO-ENTMCNC: 33.7 G/DL (ref 31–37)
MCV RBC AUTO: 95.8 FL
MONOCYTES # BLD AUTO: 0.52 X10(3) UL (ref 0.1–1)
MONOCYTES NFR BLD AUTO: 8.4 %
NEUTROPHILS # BLD AUTO: 4.1 X10 (3) UL (ref 1.5–7.7)
NEUTROPHILS # BLD AUTO: 4.1 X10(3) UL (ref 1.5–7.7)
NEUTROPHILS NFR BLD AUTO: 66.2 %
OSMOLALITY SERPL CALC.SUM OF ELEC: 285 MOSM/KG (ref 275–295)
PLATELET # BLD AUTO: 354 10(3)UL (ref 150–450)
POTASSIUM SERPL-SCNC: 4 MMOL/L (ref 3.5–5.1)
RBC # BLD AUTO: 4.09 X10(6)UL
SODIUM SERPL-SCNC: 138 MMOL/L (ref 136–145)
TSI SER-ACNC: 0.95 MIU/ML (ref 0.36–3.74)
WBC # BLD AUTO: 6.2 X10(3) UL (ref 4–11)

## 2023-07-06 PROCEDURE — 99284 EMERGENCY DEPT VISIT MOD MDM: CPT

## 2023-07-06 PROCEDURE — 80048 BASIC METABOLIC PNL TOTAL CA: CPT | Performed by: EMERGENCY MEDICINE

## 2023-07-06 PROCEDURE — 81025 URINE PREGNANCY TEST: CPT

## 2023-07-06 PROCEDURE — 96374 THER/PROPH/DIAG INJ IV PUSH: CPT

## 2023-07-06 PROCEDURE — 96375 TX/PRO/DX INJ NEW DRUG ADDON: CPT

## 2023-07-06 PROCEDURE — 85025 COMPLETE CBC W/AUTO DIFF WBC: CPT | Performed by: EMERGENCY MEDICINE

## 2023-07-06 PROCEDURE — 84443 ASSAY THYROID STIM HORMONE: CPT | Performed by: EMERGENCY MEDICINE

## 2023-07-06 PROCEDURE — 96361 HYDRATE IV INFUSION ADD-ON: CPT

## 2023-07-06 RX ORDER — METOCLOPRAMIDE HYDROCHLORIDE 5 MG/ML
10 INJECTION INTRAMUSCULAR; INTRAVENOUS ONCE
Status: COMPLETED | OUTPATIENT
Start: 2023-07-06 | End: 2023-07-06

## 2023-07-06 RX ORDER — ACETAMINOPHEN 500 MG
1000 TABLET ORAL ONCE
Status: COMPLETED | OUTPATIENT
Start: 2023-07-06 | End: 2023-07-06

## 2023-07-06 RX ORDER — DIPHENHYDRAMINE HYDROCHLORIDE 50 MG/ML
25 INJECTION INTRAMUSCULAR; INTRAVENOUS ONCE
Status: COMPLETED | OUTPATIENT
Start: 2023-07-06 | End: 2023-07-06

## 2023-07-06 RX ORDER — METOCLOPRAMIDE 10 MG/1
10 TABLET ORAL 3 TIMES DAILY PRN
Qty: 10 TABLET | Refills: 0 | Status: SHIPPED | OUTPATIENT
Start: 2023-07-06

## 2023-07-06 NOTE — ED INITIAL ASSESSMENT (HPI)
Migraines with aura for the past 1.5 weeks. Has taken multiple meds OTC with no relief.  Positive pregnancy test this am.

## 2023-07-06 NOTE — DISCHARGE INSTRUCTIONS
Headache recurs you may take Reglan as prescribed along with over-the-counter Benadryl and Tylenol. Return to the emergency department if increased headache, fever, repeated vomiting, focal weakness, or other new symptoms develop.

## 2023-07-14 ENCOUNTER — TELEPHONE (OUTPATIENT)
Dept: OBGYN CLINIC | Facility: CLINIC | Age: 38
End: 2023-07-14

## 2023-07-14 NOTE — TELEPHONE ENCOUNTER
Pt was in for an appointment with [facility]EM[facility] earlier today but had to leave because she was on her lunch break and couldn't wait longer and wasn't given a time frame she would be seen, pt upset, states this is a new pregnancy and wanted to be seen soon due to previous pregnancy loss, will like to see JJF or ASJ but no openings soon.  Please advise

## 2023-07-19 NOTE — TELEPHONE ENCOUNTER
Patient name and  verified. Patient voices she was seen in [facility] over the weekend for ectopic pregnancy. Patient declined follow up visit at this time. patient states she will call back for appt.

## 2023-07-20 ENCOUNTER — TELEPHONE (OUTPATIENT)
Dept: OBGYN CLINIC | Facility: CLINIC | Age: 38
End: 2023-07-20

## 2023-07-20 NOTE — TELEPHONE ENCOUNTER
Pt went to [facility] on 7/16 diagnosed with ectopic pregnancy, cut ovary. Received medication for pain. Pt wants a refill for pain medication. Cannot get in contact with prescribing doctor. Brian suggested reaching out to primary care physician. Please call.

## 2023-07-22 NOTE — TELEPHONE ENCOUNTER
Pt had surgery for ectopic 7 days ago. I d/w pt. Motrin 200 mg , 4 tabs tid. Pt states she will make appt with me.

## 2023-12-08 ENCOUNTER — TELEPHONE (OUTPATIENT)
Dept: OBGYN CLINIC | Facility: CLINIC | Age: 38
End: 2023-12-08

## 2023-12-08 ENCOUNTER — LAB ENCOUNTER (OUTPATIENT)
Dept: LAB | Facility: HOSPITAL | Age: 38
End: 2023-12-08
Attending: OBSTETRICS & GYNECOLOGY
Payer: COMMERCIAL

## 2023-12-08 DIAGNOSIS — N92.6 MISSED MENSES: ICD-10-CM

## 2023-12-08 DIAGNOSIS — N92.6 MISSED MENSES: Primary | ICD-10-CM

## 2023-12-08 LAB — B-HCG SERPL-ACNC: <2.6 MIU/ML

## 2023-12-08 PROCEDURE — 84702 CHORIONIC GONADOTROPIN TEST: CPT

## 2023-12-08 PROCEDURE — 36415 COLL VENOUS BLD VENIPUNCTURE: CPT

## 2023-12-08 NOTE — TELEPHONE ENCOUNTER
Possible pregnancy after ectopic pregnancy this summer, patient asking to come in Monday. Patient saw Brian Martins for the last pregnancy. Please call to advise.

## 2023-12-08 NOTE — TELEPHONE ENCOUNTER
Pt with hx ectopic pregnancy on 07/14/23. The past week, pt has been having breast tenderness and mild nausea. Pt took home pregnancy test this week and results were negative. Pt requesting Hcg level. Pt's menses is irregular. Last menses 11/14/23-11/09/23. Order for Hcg level placed.

## 2024-03-27 ENCOUNTER — LAB ENCOUNTER (OUTPATIENT)
Dept: LAB | Facility: HOSPITAL | Age: 39
End: 2024-03-27
Attending: OBSTETRICS & GYNECOLOGY
Payer: COMMERCIAL

## 2024-03-27 DIAGNOSIS — N92.6 MISSED MENSES: ICD-10-CM

## 2024-03-27 LAB
B-HCG SERPL-ACNC: 790.1 MIU/ML
PROGEST SERPL-MCNC: 11.63 NG/ML

## 2024-03-27 PROCEDURE — 36415 COLL VENOUS BLD VENIPUNCTURE: CPT

## 2024-03-27 PROCEDURE — 84702 CHORIONIC GONADOTROPIN TEST: CPT

## 2024-03-27 PROCEDURE — 84144 ASSAY OF PROGESTERONE: CPT

## 2024-03-28 ENCOUNTER — TELEPHONE (OUTPATIENT)
Dept: OBGYN CLINIC | Facility: CLINIC | Age: 39
End: 2024-03-28

## 2024-03-28 NOTE — TELEPHONE ENCOUNTER
Patient verified name and     Patient is anxious to see provider due to history of ectopic. Patient aware need for repeat HCG. Scheduled for RN education tomorrow. Aware that she will be scheduled for ob appt then. Verbalized understanding and agreed.

## 2024-03-28 NOTE — TELEPHONE ENCOUNTER
Pt calling back she did the test and wants to see the doctor. Missed menses 2/11 confirmed with HPT & results yesterday. See pt message yesterday

## 2024-03-29 ENCOUNTER — LAB ENCOUNTER (OUTPATIENT)
Dept: LAB | Facility: HOSPITAL | Age: 39
End: 2024-03-29
Attending: OBSTETRICS & GYNECOLOGY
Payer: COMMERCIAL

## 2024-03-29 ENCOUNTER — NURSE ONLY (OUTPATIENT)
Dept: OBGYN CLINIC | Facility: CLINIC | Age: 39
End: 2024-03-29
Payer: COMMERCIAL

## 2024-03-29 DIAGNOSIS — N92.6 MISSED MENSES: ICD-10-CM

## 2024-03-29 DIAGNOSIS — Z34.81 ENCOUNTER FOR SUPERVISION OF OTHER NORMAL PREGNANCY IN FIRST TRIMESTER (HCC): Primary | ICD-10-CM

## 2024-03-29 DIAGNOSIS — Z34.81 ENCOUNTER FOR SUPERVISION OF OTHER NORMAL PREGNANCY IN FIRST TRIMESTER (HCC): ICD-10-CM

## 2024-03-29 LAB
ANTIBODY SCREEN: NEGATIVE
B-HCG SERPL-ACNC: 1009.8 MIU/ML
BASOPHILS # BLD AUTO: 0.03 X10(3) UL (ref 0–0.2)
BASOPHILS NFR BLD AUTO: 0.5 %
BILIRUB UR QL: NEGATIVE
CLARITY UR: CLEAR
DEPRECATED HBV CORE AB SER IA-ACNC: 56.9 NG/ML
DEPRECATED RDW RBC AUTO: 43.4 FL (ref 35.1–46.3)
EOSINOPHIL # BLD AUTO: 0.12 X10(3) UL (ref 0–0.7)
EOSINOPHIL NFR BLD AUTO: 2 %
ERYTHROCYTE [DISTWIDTH] IN BLOOD BY AUTOMATED COUNT: 11.9 % (ref 11–15)
EST. AVERAGE GLUCOSE BLD GHB EST-MCNC: 88 MG/DL (ref 68–126)
GLUCOSE UR-MCNC: NORMAL MG/DL
HBA1C MFR BLD: 4.7 % (ref ?–5.7)
HCT VFR BLD AUTO: 38.9 %
HGB BLD-MCNC: 12.4 G/DL
HGB UR QL STRIP.AUTO: NEGATIVE
IMM GRANULOCYTES # BLD AUTO: 0.02 X10(3) UL (ref 0–1)
IMM GRANULOCYTES NFR BLD: 0.3 %
KETONES UR-MCNC: NEGATIVE MG/DL
LEUKOCYTE ESTERASE UR QL STRIP.AUTO: 25
LYMPHOCYTES # BLD AUTO: 1.69 X10(3) UL (ref 1–4)
LYMPHOCYTES NFR BLD AUTO: 28 %
MCH RBC QN AUTO: 32.1 PG (ref 26–34)
MCHC RBC AUTO-ENTMCNC: 31.9 G/DL (ref 31–37)
MCV RBC AUTO: 100.8 FL
MONOCYTES # BLD AUTO: 0.5 X10(3) UL (ref 0.1–1)
MONOCYTES NFR BLD AUTO: 8.3 %
NEUTROPHILS # BLD AUTO: 3.67 X10 (3) UL (ref 1.5–7.7)
NEUTROPHILS # BLD AUTO: 3.67 X10(3) UL (ref 1.5–7.7)
NEUTROPHILS NFR BLD AUTO: 60.9 %
NITRITE UR QL STRIP.AUTO: NEGATIVE
PH UR: 7.5 [PH] (ref 5–8)
PLATELET # BLD AUTO: 363 10(3)UL (ref 150–450)
PROT UR-MCNC: NEGATIVE MG/DL
RBC # BLD AUTO: 3.86 X10(6)UL
RH BLOOD TYPE: POSITIVE
SP GR UR STRIP: 1.01 (ref 1–1.03)
T4 FREE SERPL-MCNC: 1.3 NG/DL (ref 0.8–1.7)
TSI SER-ACNC: 6.39 MIU/ML (ref 0.55–4.78)
UROBILINOGEN UR STRIP-ACNC: NORMAL
WBC # BLD AUTO: 6 X10(3) UL (ref 4–11)

## 2024-03-29 PROCEDURE — 84439 ASSAY OF FREE THYROXINE: CPT

## 2024-03-29 PROCEDURE — 86778 TOXOPLASMA ANTIBODY IGM: CPT

## 2024-03-29 PROCEDURE — 84443 ASSAY THYROID STIM HORMONE: CPT

## 2024-03-29 PROCEDURE — 82728 ASSAY OF FERRITIN: CPT

## 2024-03-29 PROCEDURE — 81001 URINALYSIS AUTO W/SCOPE: CPT

## 2024-03-29 PROCEDURE — 86850 RBC ANTIBODY SCREEN: CPT

## 2024-03-29 PROCEDURE — 86777 TOXOPLASMA ANTIBODY: CPT

## 2024-03-29 PROCEDURE — 84702 CHORIONIC GONADOTROPIN TEST: CPT

## 2024-03-29 PROCEDURE — 36415 COLL VENOUS BLD VENIPUNCTURE: CPT

## 2024-03-29 PROCEDURE — 86901 BLOOD TYPING SEROLOGIC RH(D): CPT

## 2024-03-29 PROCEDURE — 83036 HEMOGLOBIN GLYCOSYLATED A1C: CPT

## 2024-03-29 PROCEDURE — 86780 TREPONEMA PALLIDUM: CPT

## 2024-03-29 PROCEDURE — 87340 HEPATITIS B SURFACE AG IA: CPT

## 2024-03-29 PROCEDURE — 87389 HIV-1 AG W/HIV-1&-2 AB AG IA: CPT

## 2024-03-29 PROCEDURE — 85025 COMPLETE CBC W/AUTO DIFF WBC: CPT

## 2024-03-29 PROCEDURE — 87086 URINE CULTURE/COLONY COUNT: CPT

## 2024-03-29 PROCEDURE — 86803 HEPATITIS C AB TEST: CPT

## 2024-03-29 PROCEDURE — 84144 ASSAY OF PROGESTERONE: CPT

## 2024-03-29 PROCEDURE — 86762 RUBELLA ANTIBODY: CPT

## 2024-03-29 PROCEDURE — 86900 BLOOD TYPING SEROLOGIC ABO: CPT

## 2024-03-29 RX ORDER — LEVOTHYROXINE SODIUM 112 UG/1
112 TABLET ORAL DAILY
COMMUNITY
Start: 2024-03-04

## 2024-03-29 NOTE — PROGRESS NOTES
Pt called today for RN OB Education.   LMP: 24    Pre  BMI: 26.09    EPDS score: 0/30- Referral placed.     Working ALICIA: 2024  Hx of genetic abnormality in family: Denies  Hx of varicella: vaccinated  Flu Vaccine: vaccinated  Covid Vaccine: vaccinated      Consent (if needed): KIERAN    Sterilization/Contraception: Interested    OUD Screening: Pt. Has answered NO 5P questions and has NO  risk factors.    Pt. Given What pregnant women need to know handout.       SDOH Screening: Low risk    Educational material reviewed with patient: Prenatal care, nutrition, weight gain recommendations, travel, exercise, intercourse, pregnancy changes, safe medications, pregnancy and work, fetal movement, labor and  labor, warning signs, food safety, tdap, cord blood, breastfeeding- both, circumcision- consents, and Group B strep.     Blood transfusion if needed: consents    PN labs: placed- tsh hx graves. Toxo- cats- will repeat hcg today    ASA Therapy (2 tablets,inform pt to start at 12 wks not before):   Iron Supplementation (325 mg every other day): discussed   Vitamin D (2,000 IUs daily): discussed      Optional genetic screening labs were reviewed: Cell FreeDNA, FTS with US, Quad screen MSAFP and CF screening.   - [facility] FTS   FBC Media Policy: discussed      (Before scheduling, ask the following: location preference and provider language preference)    NOB apt: - HUGO -per patient request     Disclaimer: The calendars that will be provided at your appointment, are a practice guideline and can change based on the individual.

## 2024-03-30 LAB
HBV SURFACE AG SER-ACNC: 0.19 [IU]/L
HBV SURFACE AG SERPL QL IA: NONREACTIVE
HCV AB SERPL QL IA: NONREACTIVE
PROGEST SERPL-MCNC: 10.5 NG/ML
RUBV IGG SER QL: POSITIVE
RUBV IGG SER-ACNC: 96.9 IU/ML (ref 10–?)
T PALLIDUM AB SER QL IA: NONREACTIVE

## 2024-03-31 LAB
TOXO GONDII IGG AB: <3 IU/ML
TOXO GONDII IGM: <3 AU/ML

## 2024-04-01 ENCOUNTER — TELEPHONE (OUTPATIENT)
Dept: OBGYN CLINIC | Facility: CLINIC | Age: 39
End: 2024-04-01

## 2024-04-01 ENCOUNTER — LAB ENCOUNTER (OUTPATIENT)
Dept: LAB | Facility: HOSPITAL | Age: 39
End: 2024-04-01
Attending: OBSTETRICS & GYNECOLOGY
Payer: COMMERCIAL

## 2024-04-01 DIAGNOSIS — O20.0 THREATENED ABORTION, ANTEPARTUM (HCC): ICD-10-CM

## 2024-04-01 DIAGNOSIS — O20.0 THREATENED ABORTION, ANTEPARTUM (HCC): Primary | ICD-10-CM

## 2024-04-01 LAB — B-HCG SERPL-ACNC: 1128.8 MIU/ML

## 2024-04-01 PROCEDURE — 36415 COLL VENOUS BLD VENIPUNCTURE: CPT

## 2024-04-01 PROCEDURE — 84702 CHORIONIC GONADOTROPIN TEST: CPT

## 2024-04-01 NOTE — TELEPHONE ENCOUNTER
Patient seeing ASJ tomorrow.     Frantz Domínguez,  This lab shows that the hCG has not gone up is much as expected.  I recommend another hCG blood test.  I have ordered it.  Thanks, ...   Written by Fuad Campbell MD on 4/1/2024  8:49 AM CDT View Full Comments  Seen by patient Catrachita JONES Vashti on 4/1/2024  9:23 AM

## 2024-04-01 NOTE — TELEPHONE ENCOUNTER
Called the patient but there was no answer.  The hCG has not gone up as expected.  I recommend another repeat hCG.  I placed the order for another repeat hCG.  Please call the patient.

## 2024-04-02 ENCOUNTER — INITIAL PRENATAL (OUTPATIENT)
Dept: OBGYN CLINIC | Facility: CLINIC | Age: 39
End: 2024-04-02

## 2024-04-02 ENCOUNTER — HOSPITAL ENCOUNTER (OUTPATIENT)
Dept: ULTRASOUND IMAGING | Facility: HOSPITAL | Age: 39
Discharge: HOME | End: 2024-04-02
Attending: OBSTETRICS & GYNECOLOGY
Payer: COMMERCIAL

## 2024-04-02 VITALS — BODY MASS INDEX: 27 KG/M2 | SYSTOLIC BLOOD PRESSURE: 113 MMHG | DIASTOLIC BLOOD PRESSURE: 81 MMHG | WEIGHT: 141 LBS

## 2024-04-02 DIAGNOSIS — N92.6 MISSED MENSES: ICD-10-CM

## 2024-04-02 DIAGNOSIS — N92.6 MISSED MENSES: Primary | ICD-10-CM

## 2024-04-02 PROCEDURE — 76817 TRANSVAGINAL US OBSTETRIC: CPT | Performed by: OBSTETRICS & GYNECOLOGY

## 2024-04-02 PROCEDURE — 76801 OB US < 14 WKS SINGLE FETUS: CPT | Performed by: OBSTETRICS & GYNECOLOGY

## 2024-04-02 NOTE — PROGRESS NOTES
Catrachita Kingston is a 38 year old female  Patient's last menstrual period was 2024.   Chief Complaint   Patient presents with    Prenatal Care     New OB      0 Result Notes      Component  Ref Range & Units 24  1:22 PM   Hcg Quantitative  <=4.2 mIU/mL 1,128.8 High    Comment: The table below represents average predicted HCG values for different estimated gestational ages.  Individual results may vary from predicted values. All HCG results should be evaluated in the context of the last menstrual period, pelvic exam and other clinical findings.    Negative       <=4.2    mIU/mL  Indeterminate  4.3 - 50.0  mIU/mL (Repeat suggested in 24- 48 hours)  Positive:  Ranges with normal pregnancies:  0.2-1 week  5.0- 50.0       mIU/mL  1- 2 weeks  50.0-500.0      mIU/mL  2- 3 weeks  100- 5,000      mIU/mL  3- 4 weeks  500- 10,000     mIU/mL  4- 5 weeks  1,000- 50,000   mIU/mL  5- 6 weeks  10,000- 100,000 mIU/mL  6- 8 weeks  15,000- 200,000 mIU/mL  2- 3 months 10,000- 100,000 mIU/mL   Resulting Agency [facility] ([facility])              Specimen Collected: 24  1:22 PM Last Resulted: 24  4:21 PM         Collected 3/29/2024  5:32 PM       Status: Final result       Dx: Missed menses    1 Result Note       1 Patient Communication      Component  Ref Range & Units 3/29/24  5:32 PM   Hcg Quantitative  <=4.2 mIU/mL 1,009.8 High    Comment: The table below represents average predicted HCG values for different estimated gestational ages.  Individual results may vary from predicted values. All HCG results should be evaluated in the context of the last menstrual period, pelvic exam and other clinical findings.    Negative       <=4.2    mIU/mL  Indeterminate  4.3 - 50.0  mIU/mL (Repeat suggested in 24- 48 hours)  Positive:  Ranges with normal pregnancies:  0.2-1 week  5.0- 50.0       mIU/mL  1- 2 weeks  50.0-500.0      mIU/mL  2- 3 weeks  100- 5,000      mIU/mL  3- 4 weeks  500- 10,000     mIU/mL  4- 5 weeks   1,000- 50,000   mIU/mL  5- 6 weeks  10,000- 100,000 mIU/mL  6- 8 weeks  15,000- 200,000 mIU/mL  2- 3 months 10,000- 100,000 mIU/mL   Resulting Agency [facility] ([facility])              Specimen Collected: 24  5:32 PM Last Resulted: 24 11:46 PM               OBSTETRICS HISTORY:     OB History    Para Term  AB Living   8 2 1 1 5 1   SAB IAB Ectopic Multiple Live Births   3 1 1   1      # Outcome Date GA Lbr Nirmal/2nd Weight Sex Delivery Anes PTL Lv   8 Current            7 Ectopic 23           6 Term 01/15/22 39w1d  7 lb 4.4 oz (3.3 kg) M Caesarean EPI N ANDREA      Complications: Failure to Progress in Second Stage, Fetal intolerance to labor, Late decelerations, Variable decelerations   5 SAB 21 6w0d   U SAB   FD   4  20 21w3d 07:23 / 00:06 9.9 oz (0.28 kg)  NORMAL SPONT  Y FD   3 IAB            2 SAB            1 SAB                GYNE HISTORY:         Menarche: 12-13 (3/29/2024  2:07 PM)  Period Cycle (Days): regular (3/29/2024  2:07 PM)  Period Duration (Days): 7 (3/29/2024  2:07 PM)  Period Flow: Mod (3/29/2024  2:07 PM)  Use of Birth Control (if yes, specify type): None (3/29/2024  2:07 PM)  Hx Prior Abnormal Pap: No (3/29/2024  2:07 PM)  Pap Date: 10/03/19 (3/29/2024  2:07 PM)  Pap Result Notes: WNL (3/29/2024  2:07 PM)        Latest Ref Rng & Units 10/3/2019     6:50 PM   RECENT PAP RESULTS   Thinprep Pap Negative for intraepithelial lesion or malignancy Negative for intraepithelial lesion or malignancy    HPV Negative Negative          MEDICAL HISTORY:     Past Medical History:   Diagnosis Date    Antepartum anemia (HCC) 2021    Decorative tattoo     Genital herpes simplex     Graves disease     Migraines     Thyroid disease        SURGICAL HISTORY:     Past Surgical History:   Procedure Laterality Date    LAPAROSCOPY,DIAGNOSTIC      diagnostic laparoscopy right salpingectomy    OTHER  2017    Gluteal Augmentation 2017 and Revision in 2018     SALPINGECTOMY Right 07/16/2023       SOCIAL HISTORY:     Social History     Socioeconomic History    Marital status: Single   Tobacco Use    Smoking status: Never    Smokeless tobacco: Never   Vaping Use    Vaping Use: Never used   Substance and Sexual Activity    Alcohol use: Not Currently    Drug use: Never     Social Determinants of Health     Financial Resource Strain: Low Risk  (3/29/2024)    Financial Resource Strain     Difficulty of Paying Living Expenses: Not very hard     Med Affordability: No   Food Insecurity: No Food Insecurity (3/29/2024)    Food Insecurity     Food Insecurity: Never true   Transportation Needs: No Transportation Needs (3/29/2024)    Transportation Needs     Lack of Transportation: No   Stress: Stress Concern Present (3/29/2024)    Stress     Feeling of Stress : Yes   Housing Stability: Low Risk  (3/29/2024)    Housing Stability     Housing Instability: No        FAMILY HISTORY:     Family History   Problem Relation Age of Onset    Thyroid disease Father     Thyroid disease Mother     Breast Cancer Other 47        One of 5 sisters-none others with breast CA    Cancer Maternal Grandmother     No Known Problems Maternal Grandfather     No Known Problems Paternal Grandmother     No Known Problems Paternal Grandfather     Ovarian Cancer Neg     Colon Cancer Neg        MEDICATIONS:       Current Outpatient Medications:     levothyroxine 112 MCG Oral Tab, Take 1 tablet (112 mcg total) by mouth daily., Disp: , Rfl:     metoclopramide 10 MG Oral Tab, Take 1 tablet (10 mg total) by mouth 3 (three) times daily as needed (headache)., Disp: 10 tablet, Rfl: 0    levothyroxine 125 MCG Oral Tab, Take 1 tablet (125 mcg total) by mouth every morning. (Patient not taking: Reported on 4/2/2024), Disp: , Rfl:     ALLERGIES:       Allergies   Allergen Reactions    Latex RASH    Shrimp ANAPHYLAXIS         REVIEW OF SYSTEMS:     Constitutional:    denies fever / chills  Cardiovascular:  denies chest pain or  palpitations  Respiratory:    denies shortness of breath  Gastrointestinal:  denies severe abdominal pain, frequent diarrhea or constipation, nausea / vomiting  Genitourinary:    denies dysuria, bothersome incontinence  Skin/Breast:   denies any breast pain, lumps, or discharge  Neurological:    denies frequent severe headaches  Psychiatric:   denies depression or anxiety, thoughts of harming self or others      PHYSICAL EXAM:   Blood pressure 113/81, weight 141 lb (64 kg), last menstrual period 02/11/2024, currently breastfeeding.  Constitutional:  well developed, well nourished, no distress  Abdomen:   soft, gravid, nontender  Musculoskeletal: no cva tenderness bilaterally  Skin/Hair:  no unusual rashes or bruises  Extremities:  no edema, no cyanosis, non tender bilaterally  Psychiatric:   oriented to time, place, person and situation. Appropriate mood and affect      ASSESSMENT & PLAN:     There are no diagnoses linked to this encounter.      FOLLOW-UP     Pt noted she had a ruptured ectopic pregnancy jul 2023 at [facility], pt counseled, no pain noted,  I called the US dept at [facility] and arranged for stat ultrasound at 530 pm today at [facility] main diagnostic, pt appraised and going there now.  Pt counseled on bhcg and very little increase compared to prior bhcg.  Counseled on this.        Manoj Dowling MD  4/2/2024

## 2024-04-03 ENCOUNTER — OFFICE VISIT (OUTPATIENT)
Dept: HEMATOLOGY/ONCOLOGY | Facility: HOSPITAL | Age: 39
End: 2024-04-03
Attending: OBSTETRICS & GYNECOLOGY
Payer: COMMERCIAL

## 2024-04-03 ENCOUNTER — TELEPHONE (OUTPATIENT)
Dept: OBGYN CLINIC | Facility: CLINIC | Age: 39
End: 2024-04-03

## 2024-04-03 ENCOUNTER — LAB ENCOUNTER (OUTPATIENT)
Dept: LAB | Facility: HOSPITAL | Age: 39
End: 2024-04-03
Attending: OBSTETRICS & GYNECOLOGY
Payer: COMMERCIAL

## 2024-04-03 VITALS
DIASTOLIC BLOOD PRESSURE: 64 MMHG | TEMPERATURE: 99 F | HEART RATE: 105 BPM | HEIGHT: 61 IN | WEIGHT: 142 LBS | BODY MASS INDEX: 26.81 KG/M2 | SYSTOLIC BLOOD PRESSURE: 104 MMHG | RESPIRATION RATE: 18 BRPM | OXYGEN SATURATION: 100 %

## 2024-04-03 DIAGNOSIS — O00.00: Primary | ICD-10-CM

## 2024-04-03 LAB
ALBUMIN SERPL-MCNC: 4.1 G/DL (ref 3.2–4.8)
ALP LIVER SERPL-CCNC: 79 U/L
ALT SERPL-CCNC: 11 U/L
AST SERPL-CCNC: 13 U/L (ref ?–34)
BILIRUB DIRECT SERPL-MCNC: 0.2 MG/DL (ref ?–0.3)
BILIRUB SERPL-MCNC: 0.7 MG/DL (ref 0.3–1.2)
CREAT BLD-MCNC: 0.75 MG/DL
EGFRCR SERPLBLD CKD-EPI 2021: 104 ML/MIN/1.73M2 (ref 60–?)
PROT SERPL-MCNC: 7.4 G/DL (ref 5.7–8.2)
WBC # BLD AUTO: 4.8 X10(3) UL (ref 4–11)

## 2024-04-03 PROCEDURE — 82565 ASSAY OF CREATININE: CPT

## 2024-04-03 PROCEDURE — 80076 HEPATIC FUNCTION PANEL: CPT

## 2024-04-03 PROCEDURE — 96401 CHEMO ANTI-NEOPL SQ/IM: CPT

## 2024-04-03 PROCEDURE — 85048 AUTOMATED LEUKOCYTE COUNT: CPT

## 2024-04-03 PROCEDURE — 36415 COLL VENOUS BLD VENIPUNCTURE: CPT

## 2024-04-03 NOTE — TELEPHONE ENCOUNTER
----- Message from Manoj Dowling MD sent at 4/2/2024  9:23 PM CDT -----  Called pt and left message.

## 2024-04-03 NOTE — TELEPHONE ENCOUNTER
S/p right salpingectomy for ectopic 7/2023.  After on the telephone.  I told her that she has a nonviable pregnancy based on the hCGs not doubling.  On March 27 hCG was 790 and in April 1 1128 so the hCG is not decreasing naturally and therefore recommend methotrexate because there is a high probability that she has an ectopic in the left tube.  She could also have an early intrauterine pregnancy is not viable but because of her history of ectopic I recommend giving the methotrexate today.  She should repeat her hCG in 3 days and then 3 days after that and we should see a 15% decrease from day 4 to day 7.  Today is day 1.  We will follow the hCGs weekly after day 7 until they are dropping down to 0    Please call the cancer center and schedule the patient for methotrexate today.  She is to get a stat hCG CBC and CMP before the methotrexate today.

## 2024-04-03 NOTE — PROGRESS NOTES
Pt arrived for methotrexate injection for Ectopic Pregnancy/Spontaneous .  Pt received education from ordering provider regarding diagnosis, drug, labs, and follow up care.    Lab Results   Component Value Date    HCGQN 1,128.8 (H) 2024    HCGQN 1,009.8 (H) 2024     Lab Results   Component Value Date    RH Positive 2024    WBC 4.8 2024    NE 3.67 2024    CREATSERUM 0.75 2024    AST 13 2024       Confirmed absence of the following contraindications:   - Evidence of ectopic rupture   - BHCG level greater than 5000 mIU/mL   - WBC less than or equal to 1500/mm3   - Creatinine greater than 1.5mg/dL   - AST greater than 2 times ULN     Education Record  Learner:  Patient  Barriers / Limitations:  None  Method:  Brief focused, Discussion, Printed material and Reinforcement  General Topics:  Medication, Side effects and symptom management, Plan of care reviewed and Follow-up requirements  Outcome:  Shows understanding   Comments:

## 2024-04-04 ENCOUNTER — TELEPHONE (OUTPATIENT)
Age: 39
End: 2024-04-04

## 2024-04-06 ENCOUNTER — LAB ENCOUNTER (OUTPATIENT)
Dept: LAB | Facility: HOSPITAL | Age: 39
End: 2024-04-06
Attending: OBSTETRICS & GYNECOLOGY
Payer: COMMERCIAL

## 2024-04-06 DIAGNOSIS — O00.00: ICD-10-CM

## 2024-04-06 LAB — B-HCG SERPL-ACNC: 1548.5 MIU/ML

## 2024-04-06 PROCEDURE — 36415 COLL VENOUS BLD VENIPUNCTURE: CPT

## 2024-04-06 PROCEDURE — 84702 CHORIONIC GONADOTROPIN TEST: CPT

## 2024-04-06 NOTE — PROGRESS NOTES
Spoke with patient - reviewed ultrasound and POC with methotrexate  Plan for weekly hcg till <2   Nurses - Please monitor for those results   Ectopic precautions given.     Vidhya Alejandre MD

## 2024-04-08 NOTE — PROGRESS NOTES
S/p methotrexate  Measure hCG concentration on days 4 and 7 post administration   If less than 15% hCG decline from day 4 to 7, give second dose of methotrexate 50 mg/m2 IM  If greater than or = to 15% hCG decline from day 4 to 7, draw hCG concentration weekly until hCG is undetectable    Please follow protocol     Vidhya Alejandre MD

## 2024-04-09 ENCOUNTER — LAB ENCOUNTER (OUTPATIENT)
Dept: LAB | Facility: HOSPITAL | Age: 39
End: 2024-04-09
Attending: OBSTETRICS & GYNECOLOGY
Payer: COMMERCIAL

## 2024-04-09 DIAGNOSIS — O00.00: ICD-10-CM

## 2024-04-09 LAB
ALBUMIN SERPL-MCNC: 4.2 G/DL (ref 3.2–4.8)
ALBUMIN/GLOB SERPL: 1.3 {RATIO} (ref 1–2)
ALP LIVER SERPL-CCNC: 78 U/L
ALT SERPL-CCNC: 15 U/L
ANION GAP SERPL CALC-SCNC: 5 MMOL/L (ref 0–18)
AST SERPL-CCNC: 19 U/L (ref ?–34)
B-HCG SERPL-ACNC: 1417.3 MIU/ML
BASOPHILS # BLD AUTO: 0.03 X10(3) UL (ref 0–0.2)
BASOPHILS NFR BLD AUTO: 0.5 %
BILIRUB SERPL-MCNC: 0.6 MG/DL (ref 0.3–1.2)
BUN BLD-MCNC: 9 MG/DL (ref 9–23)
BUN/CREAT SERPL: 9 (ref 10–20)
CALCIUM BLD-MCNC: 9 MG/DL (ref 8.7–10.4)
CHLORIDE SERPL-SCNC: 106 MMOL/L (ref 98–112)
CO2 SERPL-SCNC: 27 MMOL/L (ref 21–32)
CREAT BLD-MCNC: 1 MG/DL
DEPRECATED RDW RBC AUTO: 40.2 FL (ref 35.1–46.3)
EGFRCR SERPLBLD CKD-EPI 2021: 74 ML/MIN/1.73M2 (ref 60–?)
EOSINOPHIL # BLD AUTO: 0.08 X10(3) UL (ref 0–0.7)
EOSINOPHIL NFR BLD AUTO: 1.4 %
ERYTHROCYTE [DISTWIDTH] IN BLOOD BY AUTOMATED COUNT: 11.6 % (ref 11–15)
FASTING STATUS PATIENT QL REPORTED: NO
GLOBULIN PLAS-MCNC: 3.2 G/DL (ref 2.8–4.4)
GLUCOSE BLD-MCNC: 87 MG/DL (ref 70–99)
HCT VFR BLD AUTO: 35.1 %
HGB BLD-MCNC: 12 G/DL
IMM GRANULOCYTES # BLD AUTO: 0.01 X10(3) UL (ref 0–1)
IMM GRANULOCYTES NFR BLD: 0.2 %
LYMPHOCYTES # BLD AUTO: 1.75 X10(3) UL (ref 1–4)
LYMPHOCYTES NFR BLD AUTO: 29.6 %
MCH RBC QN AUTO: 33.1 PG (ref 26–34)
MCHC RBC AUTO-ENTMCNC: 34.2 G/DL (ref 31–37)
MCV RBC AUTO: 97 FL
MONOCYTES # BLD AUTO: 0.56 X10(3) UL (ref 0.1–1)
MONOCYTES NFR BLD AUTO: 9.5 %
NEUTROPHILS # BLD AUTO: 3.48 X10 (3) UL (ref 1.5–7.7)
NEUTROPHILS # BLD AUTO: 3.48 X10(3) UL (ref 1.5–7.7)
NEUTROPHILS NFR BLD AUTO: 58.8 %
OSMOLALITY SERPL CALC.SUM OF ELEC: 284 MOSM/KG (ref 275–295)
PLATELET # BLD AUTO: 378 10(3)UL (ref 150–450)
POTASSIUM SERPL-SCNC: 3.5 MMOL/L (ref 3.5–5.1)
PROT SERPL-MCNC: 7.4 G/DL (ref 5.7–8.2)
RBC # BLD AUTO: 3.62 X10(6)UL
SODIUM SERPL-SCNC: 138 MMOL/L (ref 136–145)
WBC # BLD AUTO: 5.9 X10(3) UL (ref 4–11)

## 2024-04-09 PROCEDURE — 36415 COLL VENOUS BLD VENIPUNCTURE: CPT

## 2024-04-09 PROCEDURE — 84702 CHORIONIC GONADOTROPIN TEST: CPT

## 2024-04-09 PROCEDURE — 80053 COMPREHEN METABOLIC PANEL: CPT

## 2024-04-09 PROCEDURE — 85025 COMPLETE CBC W/AUTO DIFF WBC: CPT

## 2024-04-12 ENCOUNTER — TELEPHONE (OUTPATIENT)
Dept: OBGYN CLINIC | Facility: CLINIC | Age: 39
End: 2024-04-12

## 2024-04-12 ENCOUNTER — NURSE ONLY (OUTPATIENT)
Dept: HEMATOLOGY/ONCOLOGY | Facility: HOSPITAL | Age: 39
End: 2024-04-12
Attending: OBSTETRICS & GYNECOLOGY
Payer: COMMERCIAL

## 2024-04-12 ENCOUNTER — TELEPHONE (OUTPATIENT)
Age: 39
End: 2024-04-12

## 2024-04-12 VITALS
DIASTOLIC BLOOD PRESSURE: 68 MMHG | RESPIRATION RATE: 16 BRPM | WEIGHT: 139.38 LBS | HEART RATE: 98 BPM | TEMPERATURE: 99 F | BODY MASS INDEX: 26.31 KG/M2 | SYSTOLIC BLOOD PRESSURE: 110 MMHG | OXYGEN SATURATION: 100 % | HEIGHT: 60.98 IN

## 2024-04-12 DIAGNOSIS — O00.00: Primary | ICD-10-CM

## 2024-04-12 PROCEDURE — 96401 CHEMO ANTI-NEOPL SQ/IM: CPT

## 2024-04-12 NOTE — TELEPHONE ENCOUNTER
Confirmed with TA that pt should get second dose.     Pt called to be notified of instructions. Pt to go to ED for second dose.

## 2024-04-12 NOTE — PROGRESS NOTES
Pt here for a methotrexate injection. Pt denies any issues or concerns.      Ordering MD: Dr. Campbell     Pt tolerated injection without difficulty or complaint.       Education Record  Learner:  Patient  Disease / Diagnosis: ectopic pregnancy  Barriers / Limitations:  None  Method:  Discussion  General Topics:  Plan of care reviewed  Outcome:  Shows understanding

## 2024-04-12 NOTE — TELEPHONE ENCOUNTER
----- Message from Vidhya Alejandre MD sent at 4/8/2024  3:23 PM CDT -----  S/p methotrexate  Measure hCG concentration on days 4 and 7 post administration   If less than 15% hCG decline from day 4 to 7, give second dose of methotrexate 50 mg/m2 IM  If greater than or = to 15% hCG decline from day 4 to 7, draw hCG concentration weekly until hCG is undetectable    Please follow protocol     Vidhya Alejandre MD

## 2024-04-12 NOTE — TELEPHONE ENCOUNTER
[facility]  [address], [place], [address]  Phone: [phone]    [facility]  [facility]  [address] [place] [place], [place], [address]  Phone: [phone]    [facility] Crews [facility]  [facility]  [address], [address], [place], [address]  Phone: [phone]    Jennifer Nova [facility]  [address] [place], [place], [address]  Phone: [phone]    Blanca Mendes [facility]  [address], [place], [place], [address]  Phone: [phone]

## 2024-04-12 NOTE — TELEPHONE ENCOUNTER
Pt Name and  verified.  Pt informed of need for second dose. Pt states that she is currently cramping and bleeding as well. Wanted to know if she should get second dose. Did advise she can go to lab and get another HCG level but orders for second dose of MXT will be sent over to infusion center. Pt voiced understanding.

## 2024-04-15 ENCOUNTER — TELEPHONE (OUTPATIENT)
Dept: OBGYN CLINIC | Facility: CLINIC | Age: 39
End: 2024-04-15

## 2024-04-16 ENCOUNTER — TELEPHONE (OUTPATIENT)
Dept: OBGYN UNIT | Facility: HOSPITAL | Age: 39
End: 2024-04-16

## 2024-04-16 ENCOUNTER — TELEPHONE (OUTPATIENT)
Dept: OBGYN CLINIC | Facility: CLINIC | Age: 39
End: 2024-04-16

## 2024-04-16 ENCOUNTER — PATIENT MESSAGE (OUTPATIENT)
Dept: OBGYN CLINIC | Facility: CLINIC | Age: 39
End: 2024-04-16

## 2024-04-16 DIAGNOSIS — O03.4 INCOMPLETE SPONTANEOUS ABORTION (HCC): Primary | ICD-10-CM

## 2024-04-16 NOTE — TELEPHONE ENCOUNTER
From: Catrachita Kingston  To: Manoj Dowling  Sent: 4/16/2024 3:55 AM CDT  Subject: Hi Doctor     I hope that you're doing well. I've been bleeding so heavily for about a week now. It's the most I've ever bled in my life. I'm passing tissue still and I'm constantly filling up pads and even super plus tampons. I feel very weak and just not well. Is this normal with the infusion shots I've gotten? I just want to be sure nothing is wrong with me.

## 2024-04-16 NOTE — TELEPHONE ENCOUNTER
I called the patient.  She states that she had some initial bleeding on Saturday, April 6 and passed tissue the size of a golf ball and then again yesterday passed more tissue.  She denies any pelvic pain.  I asked her if she could get a repeat hCG today and she said she would.  I asked her if she could see me today or tomorrow she said she would see me tomorrow after work.  I will see her in the office tomorrow.

## 2024-04-17 ENCOUNTER — LAB ENCOUNTER (OUTPATIENT)
Dept: LAB | Facility: HOSPITAL | Age: 39
End: 2024-04-17
Attending: OBSTETRICS & GYNECOLOGY
Payer: COMMERCIAL

## 2024-04-17 DIAGNOSIS — O00.00: ICD-10-CM

## 2024-04-17 DIAGNOSIS — O03.4 INCOMPLETE SPONTANEOUS ABORTION (HCC): ICD-10-CM

## 2024-04-17 LAB
ALBUMIN SERPL-MCNC: 4.2 G/DL (ref 3.2–4.8)
ALBUMIN/GLOB SERPL: 1.3 {RATIO} (ref 1–2)
ALP LIVER SERPL-CCNC: 87 U/L
ALT SERPL-CCNC: 18 U/L
ANION GAP SERPL CALC-SCNC: 5 MMOL/L (ref 0–18)
AST SERPL-CCNC: 21 U/L (ref ?–34)
B-HCG SERPL-ACNC: 23.4 MIU/ML
BILIRUB SERPL-MCNC: 0.6 MG/DL (ref 0.3–1.2)
BUN BLD-MCNC: 10 MG/DL (ref 9–23)
BUN/CREAT SERPL: 10.1 (ref 10–20)
CALCIUM BLD-MCNC: 9.4 MG/DL (ref 8.7–10.4)
CHLORIDE SERPL-SCNC: 106 MMOL/L (ref 98–112)
CO2 SERPL-SCNC: 29 MMOL/L (ref 21–32)
CREAT BLD-MCNC: 0.99 MG/DL
DEPRECATED RDW RBC AUTO: 42.2 FL (ref 35.1–46.3)
EGFRCR SERPLBLD CKD-EPI 2021: 75 ML/MIN/1.73M2 (ref 60–?)
ERYTHROCYTE [DISTWIDTH] IN BLOOD BY AUTOMATED COUNT: 11.7 % (ref 11–15)
FASTING STATUS PATIENT QL REPORTED: NO
GLOBULIN PLAS-MCNC: 3.2 G/DL (ref 2.8–4.4)
GLUCOSE BLD-MCNC: 89 MG/DL (ref 70–99)
HCT VFR BLD AUTO: 34.8 %
HGB BLD-MCNC: 12.3 G/DL
MCH RBC QN AUTO: 34.6 PG (ref 26–34)
MCHC RBC AUTO-ENTMCNC: 35.3 G/DL (ref 31–37)
MCV RBC AUTO: 98 FL
OSMOLALITY SERPL CALC.SUM OF ELEC: 289 MOSM/KG (ref 275–295)
PLATELET # BLD AUTO: 410 10(3)UL (ref 150–450)
POTASSIUM SERPL-SCNC: 4 MMOL/L (ref 3.5–5.1)
PROT SERPL-MCNC: 7.4 G/DL (ref 5.7–8.2)
RBC # BLD AUTO: 3.55 X10(6)UL
SODIUM SERPL-SCNC: 140 MMOL/L (ref 136–145)
WBC # BLD AUTO: 5.9 X10(3) UL (ref 4–11)

## 2024-04-17 PROCEDURE — 36415 COLL VENOUS BLD VENIPUNCTURE: CPT

## 2024-04-17 PROCEDURE — 84702 CHORIONIC GONADOTROPIN TEST: CPT

## 2024-04-17 PROCEDURE — 80053 COMPREHEN METABOLIC PANEL: CPT

## 2024-04-17 PROCEDURE — 85027 COMPLETE CBC AUTOMATED: CPT

## 2024-04-19 ENCOUNTER — TELEPHONE (OUTPATIENT)
Dept: OBGYN CLINIC | Facility: CLINIC | Age: 39
End: 2024-04-19

## 2024-04-19 DIAGNOSIS — O20.0 THREATENED ABORTION, ANTEPARTUM (HCC): Primary | ICD-10-CM

## 2024-04-19 NOTE — TELEPHONE ENCOUNTER
Patient verified name and     Patient informed of results and recommendations. Verbalized understanding and agreed.

## 2024-04-19 NOTE — TELEPHONE ENCOUNTER
----- Message from Fuad Campbell MD sent at 4/19/2024 12:05 PM CDT -----  Negative.  Please call the patient and ask her to repeat the beta hCGs weekly.  I have ordered another hCG

## 2024-09-26 ENCOUNTER — TELEPHONE (OUTPATIENT)
Dept: OBGYN CLINIC | Facility: CLINIC | Age: 39
End: 2024-09-26

## 2024-09-26 NOTE — TELEPHONE ENCOUNTER
Call placed to pt to offer appointment; pt declines at this time. Pt is undecided if she would like to request care at this office. Pt will call back if appointment desired.

## 2024-09-26 NOTE — TELEPHONE ENCOUNTER
[facility] care for a new pregnancy. Please advise.

## (undated) DIAGNOSIS — B96.89 BACTERIAL VAGINOSIS: Primary | ICD-10-CM

## (undated) DIAGNOSIS — N76.0 BACTERIAL VAGINOSIS: Primary | ICD-10-CM

## (undated) NOTE — LETTER
8/2/2021              Catrachita Carpenter [id number]        [facility] [address]         To whom it may concern,    This note is to inform that our prenatal patient Catrachita would benefit from working from home until further notice

## (undated) NOTE — LETTER
[facility]  Administration of Anesthesia  1. Ward Marlow, or _________________________________ acting on her behalf, (Patient) (Dependent/Representative) request to receive anesthesia for my pending procedure/operation/treatment. 6. OBSTETRIC PATIENTS: Specific risks/consequences of spinal/epidural anesthesia may include itching, low blood pressure, difficulty urinating, slowing of the baby's heart rate and headache.  Rare risks include infections, high spinal block, spinal bleeding ___________________________________________________           _____________________________________________________  Date/Time                                                                                               Responsible person in case of minor

## (undated) NOTE — ED AVS SNAPSHOT
Edward Abreu   MRN: [id number]    Department:  [facility] Emergency Department   Date of Visit:  6/28/2019           Disclosure     Insurance plans vary and the physician(s) referred by the ER may not be covered by your plan.  Please contact CARE PHYSICIAN AT ONCE OR RETURN IMMEDIATELY TO THE EMERGENCY DEPARTMENT. If you have been prescribed any medication(s), please fill your prescription right away and begin taking the medication(s) as directed.   If you believe that any of the medications

## (undated) NOTE — LETTER
7/31/2019              Catrachita Vashti        [address] [facility]         Dear Radha Troncoso,    This letter is to inform you that our office has made several attempts to reach you by phone without success.   We were attempting to

## (undated) NOTE — MR AVS SNAPSHOT
After Visit Summary   10/3/2019    Dwayne Lal    MRN: [id number]           Visit Information     Date & Time  10/3/2019  6:00 PM Provider  Jackson Contreras MD [address] [place] [address] , [address] [place] [address] Dept.  Phone  [phone] y DO YOU KNOW WHERE TO GO? Injury & illness are never convenient. If you are dealing with a   non-emergency, consider your options before heading to an ER.          SAME DAY  APPOINTMENTS  Available at primary care offices      AFTER HOURS CARE  King Buckner

## (undated) NOTE — LETTER
VACCINE ADMINISTRATION RECORD    Date: 2021  Vaccine administered to: Rowena Morales     : 1985    MRN: [id number]    A copy of the appropriate Centers for Disease Control and Prevention Vaccine Information statement has been provided.  I hav

## (undated) NOTE — LETTER
10/1/2020          To Whom It May Concern:    Gely Krueger is currently under my medical care and may  return to work on Monday October 12,2020. She may return to work without restrictions.      If you require additional information please contact our

## (undated) NOTE — LETTER
1/12/2022              Catrachita Muñoz [facility] [address]        [facility] [address]         To Whom It May Concern,    The above named patient is currently under my prenatal care. At this time, I am recommending that my patient begin her maternity on 1/14/2022 due to pregnancy related issues. Please contact my office with any questions.      Sincerely,    Oseas Orozco MD, MD  [address]   [address] [place] [address]  [phone]

## (undated) NOTE — ED AVS SNAPSHOT
Gena Summers   MRN: [id number]    Department:  [facility] Emergency Department   Date of Visit:  8/14/2017           Disclosure     Insurance plans vary and the physician(s) referred by the ER may not be covered by your plan.  Please contact CARE PHYSICIAN AT ONCE OR RETURN IMMEDIATELY TO THE EMERGENCY DEPARTMENT. If you have been prescribed any medication(s), please fill your prescription right away and begin taking the medication(s) as directed.   If you believe that any of the medications

## (undated) NOTE — LETTER
Date & Time: 12/20/2019, 10:14 PM  Patient: Jacinto Beaulieu  Encounter Provider(s):    NILDA Lindo       To Whom It May Concern:    Jacinto Beaulieu was seen and treated in our department on 12/20/2019. She can return to work 12/24/2019.     If

## (undated) NOTE — LETTER
6/29/2021              Catrachita Kingston        [facility] [address]        [facility] [address]         To Whom It May Concern,    The above named patient is currently under my medical care for pregnancy.  Due to pregnancy related symptoms, my pa

## (undated) NOTE — LETTER
10/27/2021              Catrachita Kingston        [facility] [address]        [facility] [address]         To Whom It May Concern,    The above named patient is currently under my prenatal care. She may return back to working 2 days a week.  Feel f

## (undated) NOTE — LETTER
[facility]  Administration of Anesthesia  1. Niels Galicia, or _________________________________ acting on her behalf, (Patient) (Dependent/Representative) request to receive anesthesia for my pending procedure/operation/treatment. bleeding, seizure, cardiac arrest and death. 7. AWARENESS: I understand that it is possible (but unlikely) to have explicit memory of events from the operating room while under general anesthesia.   8. ELECTROCONVULSIVE THERAPY PATIENTS: This consent serve below affirms that prior to the time of the procedure, I have explained to the patient and/or his/her guardian, the risks and benefits of undergoing anesthesia, as well as any reasonable alternatives.     ___________________________________________________

## (undated) NOTE — LETTER
[facility]  Administration of Anesthesia  1. Shayy Mitchell, or _________________________________ acting on her behalf, (Patient) (Dependent/Representative) request to receive anesthesia for my pending procedure/operation/treatment. bleeding, seizure, cardiac arrest and death. 7. AWARENESS: I understand that it is possible (but unlikely) to have explicit memory of events from the operating room while under general anesthesia.   8. ELECTROCONVULSIVE THERAPY PATIENTS: This consent serve below affirms that prior to the time of the procedure, I have explained to the patient and/or his/her guardian, the risks and benefits of undergoing anesthesia, as well as any reasonable alternatives.     ___________________________________________________

## (undated) NOTE — LETTER
10/27/2021              Catrachita Kingston        [facility] [address]        [facility] [address]         To Whom It May Concern,    The above named patient is currently under my prenatal care. She may have prenatal massages as tolerated.  Feel fr

## (undated) NOTE — LETTER
[address]      Authorization for Surgical Operation and Procedure     Date:_09/23/2020__________                                                                                                         Ti 4.   Should the need arise during my operation or immediate post-operative period, I also consent to the administration of blood and/or blood products.   Further, I understand that despite careful testing and screening of blood or blood products by justin 8.   I recognize that in the event my procedure results in extended X-Ray/fluoroscopy time, I may develop a skin reaction. 9.  If I have a Do Not Attempt Resuscitation (DNAR) order in place, that status will be suspended while in the operating room, proc STATEMENT OF PHYSICIAN My signature below affirms that prior to the time of the procedure; I have explained to the patient and/or his/her legal representative, the risks and benefits involved in the proposed treatment and any reasonable alternative to the

## (undated) NOTE — ED AVS SNAPSHOT
Stephane Cartagena   MRN: [id number]    Department:  [facility]   Date of Visit:  12/20/2019           Disclosure     Insurance plans vary and the physician(s) referred by the ER may not be covered by your plan.  Please contact CARE PHYSICIAN AT ONCE OR RETURN IMMEDIATELY TO THE EMERGENCY DEPARTMENT. If you have been prescribed any medication(s), please fill your prescription right away and begin taking the medication(s) as directed.   If you believe that any of the medications